# Patient Record
Sex: FEMALE | Race: WHITE | NOT HISPANIC OR LATINO
[De-identification: names, ages, dates, MRNs, and addresses within clinical notes are randomized per-mention and may not be internally consistent; named-entity substitution may affect disease eponyms.]

---

## 2017-05-08 ENCOUNTER — APPOINTMENT (OUTPATIENT)
Dept: ENDOCRINOLOGY | Facility: CLINIC | Age: 67
End: 2017-05-08

## 2017-06-16 ENCOUNTER — MEDICATION RENEWAL (OUTPATIENT)
Age: 67
End: 2017-06-16

## 2018-06-25 ENCOUNTER — NON-APPOINTMENT (OUTPATIENT)
Age: 68
End: 2018-06-25

## 2018-06-25 ENCOUNTER — APPOINTMENT (OUTPATIENT)
Dept: CARDIOLOGY | Facility: CLINIC | Age: 68
End: 2018-06-25
Payer: MEDICARE

## 2018-06-25 VITALS
SYSTOLIC BLOOD PRESSURE: 127 MMHG | RESPIRATION RATE: 12 BRPM | OXYGEN SATURATION: 97 % | HEART RATE: 82 BPM | DIASTOLIC BLOOD PRESSURE: 85 MMHG | HEIGHT: 63 IN | BODY MASS INDEX: 32.78 KG/M2 | WEIGHT: 185 LBS

## 2018-06-25 DIAGNOSIS — R42 DIZZINESS AND GIDDINESS: ICD-10-CM

## 2018-06-25 DIAGNOSIS — I73.9 PERIPHERAL VASCULAR DISEASE, UNSPECIFIED: ICD-10-CM

## 2018-06-25 PROCEDURE — 93306 TTE W/DOPPLER COMPLETE: CPT

## 2018-06-25 PROCEDURE — 99215 OFFICE O/P EST HI 40 MIN: CPT | Mod: 25

## 2019-01-04 ENCOUNTER — APPOINTMENT (OUTPATIENT)
Dept: ENDOCRINOLOGY | Facility: CLINIC | Age: 69
End: 2019-01-04
Payer: MEDICARE

## 2019-01-04 VITALS
BODY MASS INDEX: 33.59 KG/M2 | SYSTOLIC BLOOD PRESSURE: 120 MMHG | HEIGHT: 63 IN | DIASTOLIC BLOOD PRESSURE: 80 MMHG | OXYGEN SATURATION: 97 % | WEIGHT: 189.6 LBS | HEART RATE: 74 BPM

## 2019-01-04 DIAGNOSIS — E04.2 NONTOXIC MULTINODULAR GOITER: ICD-10-CM

## 2019-01-04 LAB
GLUCOSE BLDC GLUCOMTR-MCNC: 153
HBA1C MFR BLD HPLC: 6.3

## 2019-01-04 PROCEDURE — 99214 OFFICE O/P EST MOD 30 MIN: CPT | Mod: 25

## 2019-01-04 PROCEDURE — 83036 HEMOGLOBIN GLYCOSYLATED A1C: CPT | Mod: QW

## 2019-01-04 PROCEDURE — 82962 GLUCOSE BLOOD TEST: CPT

## 2019-01-04 NOTE — HISTORY OF PRESENT ILLNESS
[FreeTextEntry1] : 68 y.o. female with h/o Type 2 DM diet controlled and hypothyroidism and thyroid nodules here for follow up visit. Taking LT4 88 mcg daily since going to [facility] last year. C/o joint pains and back pain. Taking Vytorin 10/20 mg daily. No neck complaints. Never had repeat thyroid ultrasound. Last sonogram in October 2014 showed b/l nodules with largest right mid/lower pole measuring 2.2 cm. Had FNA of right nodule on 10/21/14 c/w nodular goiter with cystification.\par \par Regarding Type 2 DM,  watching diet but did eat bagel this morning.  Not monitoring FS at home. UTD with optho. No retinopathy. C/o dry eyes. No polyuria and no polydipsia.

## 2019-01-04 NOTE — REVIEW OF SYSTEMS
[Fatigue] : fatigue [Negative] : Gastrointestinal [Recent Weight Gain (___ Lbs)] : recent [unfilled] ~Ulb weight gain [Polyuria] : no polyuria [Joint Pain] : joint pain [Back Pain] : back pain [Pain/Numbness of Digits] : no pain/numbness of digits [Polydipsia] : no polydipsia [Swelling] : swelling

## 2019-01-04 NOTE — ASSESSMENT
[Carbohydrate Consistent Diet] : carbohydrate consistent diet [FreeTextEntry1] : 68 y.o. female with h/o Type 2 DM, hyperlipidemia, hypothyroidism and thyroid nodules.\par 1. Type 2 DM- Good control with Hba1c of 6.3%. Encouraged carbohydrate consistent diet and exercise. Will check CMP and urine microalb/cr ratio.\par 2. BP is at goal\par 3. Hyperlipidemia- Will check lipids and will continue Vytorin.\par 4. Hypothyroidism- Will check TFTs and adjust LT4 accordingly\par 5. Thyroid nodules- Will check thyroid ultrasound to evaluate nodules. If stable, will continue to monitor.\par \par Follow up in 4 months or when returns from [facility] [Importance of Diet and Exercise] : importance of diet and exercise to improve glycemic control, achieve weight loss and improve cardiovascular health

## 2019-01-04 NOTE — PHYSICAL EXAM
[Alert] : alert [Normal Sclera/Conjunctiva] : normal sclera/conjunctiva [EOMI] : extra ocular movement intact [No Accessory Muscle Use] : no accessory muscle use [Clear to Auscultation] : lungs were clear to auscultation bilaterally [Normal S1, S2] : normal S1 and S2 [Normal Bowel Sounds] : normal bowel sounds [Not Tender] : non-tender [Soft] : abdomen soft [Not Distended] : not distended [No Clubbing, Cyanosis] : no clubbing  or cyanosis of the fingernails [No Rash] : no rash [Right Foot Was Examined] : right foot ~C was examined [Left Foot Was Examined] : left foot ~C was examined [Swelling] : swollen [Normal] : normal [2+] : 2+ in the dorsalis pedis [Normal Reflexes] : deep tendon reflexes were 2+ and symmetric [Normal Affect] : the affect was normal [Normal Mood] : the mood was normal [de-identified] : right nodule [de-identified] : b/l edema

## 2019-01-04 NOTE — DATA REVIEWED
[FreeTextEntry1] : Labs\par 11/29/16\par glucose 91\par BUN/cr 12/0.68\par calcium 9.4\par LFTs normal\par chol 172 HDL 59 LDL 94 tri 95\par TSH 0.06 Free T4 0.9\par T3 137\par 25 vitamin D 26\par \par

## 2019-01-07 LAB
25(OH)D3 SERPL-MCNC: 28.8 NG/ML
ALBUMIN SERPL ELPH-MCNC: 4.3 G/DL
ALP BLD-CCNC: 90 U/L
ALT SERPL-CCNC: 24 U/L
ANION GAP SERPL CALC-SCNC: 10 MMOL/L
AST SERPL-CCNC: 22 U/L
BASOPHILS # BLD AUTO: 0.05 K/UL
BASOPHILS NFR BLD AUTO: 0.7 %
BILIRUB SERPL-MCNC: 0.7 MG/DL
BUN SERPL-MCNC: 10 MG/DL
CALCIUM SERPL-MCNC: 8.9 MG/DL
CHLORIDE SERPL-SCNC: 104 MMOL/L
CHOLEST SERPL-MCNC: 167 MG/DL
CHOLEST/HDLC SERPL: 3.1 RATIO
CO2 SERPL-SCNC: 26 MMOL/L
CREAT SERPL-MCNC: 0.66 MG/DL
CREAT SPEC-SCNC: 63 MG/DL
EOSINOPHIL # BLD AUTO: 0.14 K/UL
EOSINOPHIL NFR BLD AUTO: 1.9 %
GLUCOSE SERPL-MCNC: 98 MG/DL
HCT VFR BLD CALC: 40.7 %
HDLC SERPL-MCNC: 54 MG/DL
HGB BLD-MCNC: 12.8 G/DL
IMM GRANULOCYTES NFR BLD AUTO: 0.3 %
LDLC SERPL CALC-MCNC: 86 MG/DL
LYMPHOCYTES # BLD AUTO: 2.16 K/UL
LYMPHOCYTES NFR BLD AUTO: 28.9 %
MAN DIFF?: NORMAL
MCHC RBC-ENTMCNC: 28.4 PG
MCHC RBC-ENTMCNC: 31.4 GM/DL
MCV RBC AUTO: 90.4 FL
MICROALBUMIN 24H UR DL<=1MG/L-MCNC: <1.2 MG/DL
MICROALBUMIN/CREAT 24H UR-RTO: NORMAL
MONOCYTES # BLD AUTO: 0.66 K/UL
MONOCYTES NFR BLD AUTO: 8.8 %
NEUTROPHILS # BLD AUTO: 4.44 K/UL
NEUTROPHILS NFR BLD AUTO: 59.4 %
PLATELET # BLD AUTO: 319 K/UL
POTASSIUM SERPL-SCNC: 4.2 MMOL/L
PROT SERPL-MCNC: 7.4 G/DL
RBC # BLD: 4.5 M/UL
RBC # FLD: 13.9 %
SODIUM SERPL-SCNC: 140 MMOL/L
T4 FREE SERPL-MCNC: 1.6 NG/DL
TRIGL SERPL-MCNC: 137 MG/DL
TSH SERPL-ACNC: 0.07 UIU/ML
VIT B12 SERPL-MCNC: 1005 PG/ML
WBC # FLD AUTO: 7.47 K/UL

## 2019-01-07 RX ORDER — LEVOTHYROXINE SODIUM 0.09 MG/1
88 TABLET ORAL DAILY
Qty: 90 | Refills: 3 | Status: DISCONTINUED | COMMUNITY
Start: 2019-01-04 | End: 2019-01-07

## 2019-01-11 ENCOUNTER — NON-APPOINTMENT (OUTPATIENT)
Age: 69
End: 2019-01-11

## 2019-01-11 ENCOUNTER — APPOINTMENT (OUTPATIENT)
Dept: CARDIOLOGY | Facility: CLINIC | Age: 69
End: 2019-01-11
Payer: MEDICARE

## 2019-01-11 VITALS
TEMPERATURE: 97.4 F | HEIGHT: 63 IN | OXYGEN SATURATION: 94 % | HEART RATE: 78 BPM | BODY MASS INDEX: 33.31 KG/M2 | RESPIRATION RATE: 12 BRPM | DIASTOLIC BLOOD PRESSURE: 86 MMHG | SYSTOLIC BLOOD PRESSURE: 138 MMHG | WEIGHT: 188 LBS

## 2019-01-11 PROCEDURE — 99214 OFFICE O/P EST MOD 30 MIN: CPT | Mod: 25

## 2019-01-22 ENCOUNTER — NON-APPOINTMENT (OUTPATIENT)
Age: 69
End: 2019-01-22

## 2019-01-22 NOTE — HISTORY OF PRESENT ILLNESS
[FreeTextEntry1] : Patient is a 68-year-old woman with a history of hypothyroidism, hyperlipidemia, and family history of coronary artery disease who presents today for evaluation of hyperlipidemia. She mentions continuing to experience dyspnea with exertion. She otherwise denies any chest pain, dyspnea at rest, palpitations, dizziness, and headaches. She also indicates improvement in her lower extremity edema.

## 2019-01-22 NOTE — PHYSICAL EXAM
[General Appearance - Well Developed] : well developed [Normal Appearance] : normal appearance [Well Groomed] : well groomed [General Appearance - Well Nourished] : well nourished [No Deformities] : no deformities [General Appearance - In No Acute Distress] : no acute distress [Normal Conjunctiva] : the conjunctiva exhibited no abnormalities [FreeTextEntry1] : extraocular muscles intact. Anicteric sclerae. [Normal Oral Mucosa] : normal oral mucosa [No Oral Pallor] : no oral pallor [No Oral Cyanosis] : no oral cyanosis [Normal Jugular Venous A Waves Present] : normal jugular venous A waves present [Normal Jugular Venous V Waves Present] : normal jugular venous V waves present [No Jugular Venous Velasquez A Waves] : no jugular venous velasquez A waves [Respiration, Rhythm And Depth] : normal respiratory rhythm and effort [Exaggerated Use Of Accessory Muscles For Inspiration] : no accessory muscle use [Auscultation Breath Sounds / Voice Sounds] : lungs were clear to auscultation bilaterally [Bowel Sounds] : normal bowel sounds [Abdomen Soft] : soft [Abdomen Tenderness] : non-tender [Abnormal Walk] : normal gait [Gait - Sufficient For Exercise Testing] : the gait was sufficient for exercise testing [Nail Clubbing] : no clubbing of the fingernails [Cyanosis, Localized] : no localized cyanosis [Petechial Hemorrhages (___cm)] : no petechial hemorrhages [Skin Color & Pigmentation] : normal skin color and pigmentation [] : no rash [Skin Lesions] : no skin lesions [No Skin Ulcers] : no skin ulcer [Oriented To Time, Place, And Person] : oriented to person, place, and time [Affect] : the affect was normal [Mood] : the mood was normal [No Anxiety] : not feeling anxious [Normal Rate] : normal [Rhythm Regular] : regular [Normal S1] : normal S1 [Normal S2] : normal S2 [S3] : no S3 [No Murmur] : no murmurs heard [Right Carotid Bruit] : no bruit heard over the right carotid [Left Carotid Bruit] : no bruit heard over the left carotid [1+] : left 1+ [Bruit] : no bruit heard [Nonpitting Edema] : nonpitting edema present

## 2019-01-22 NOTE — DISCUSSION/SUMMARY
[FreeTextEntry1] : IMPRESSION: Mrs. Lieberman is a 68-year-old woman with a history of hypothyroidism, hyperlipidemia, and family history of coronary artery disease presents today for follow up of hyperlipidemia and evaluation of dyspnea with exertion.\par \par PLAN:\par 1. Her most recent lipid profile was good, thus she will continue on Vytorin 10-20mg daily.\par 2. I have asked her to schedule an exercise stress test given her dyspnea with exertion.\par 3. She will followup with me after her stress test is performed should there be any abnormalities.\par

## 2019-01-23 ENCOUNTER — APPOINTMENT (OUTPATIENT)
Dept: CARDIOLOGY | Facility: CLINIC | Age: 69
End: 2019-01-23
Payer: MEDICARE

## 2019-01-23 PROCEDURE — 93015 CV STRESS TEST SUPVJ I&R: CPT

## 2019-02-06 ENCOUNTER — APPOINTMENT (OUTPATIENT)
Dept: CARDIOLOGY | Facility: CLINIC | Age: 69
End: 2019-02-06
Payer: MEDICARE

## 2019-02-06 PROCEDURE — 78452 HT MUSCLE IMAGE SPECT MULT: CPT

## 2019-02-06 PROCEDURE — A9500: CPT

## 2019-02-06 PROCEDURE — 93015 CV STRESS TEST SUPVJ I&R: CPT

## 2019-02-15 ENCOUNTER — OUTPATIENT (OUTPATIENT)
Dept: OUTPATIENT SERVICES | Facility: HOSPITAL | Age: 69
LOS: 1 days | End: 2019-02-15
Payer: MEDICARE

## 2019-02-15 VITALS
OXYGEN SATURATION: 97 % | TEMPERATURE: 98 F | DIASTOLIC BLOOD PRESSURE: 81 MMHG | RESPIRATION RATE: 18 BRPM | HEIGHT: 63 IN | HEART RATE: 86 BPM | SYSTOLIC BLOOD PRESSURE: 157 MMHG | WEIGHT: 182.1 LBS

## 2019-02-15 DIAGNOSIS — Z90.89 ACQUIRED ABSENCE OF OTHER ORGANS: Chronic | ICD-10-CM

## 2019-02-15 DIAGNOSIS — Z98.1 ARTHRODESIS STATUS: Chronic | ICD-10-CM

## 2019-02-15 DIAGNOSIS — I25.10 ATHEROSCLEROTIC HEART DISEASE OF NATIVE CORONARY ARTERY WITHOUT ANGINA PECTORIS: ICD-10-CM

## 2019-02-15 DIAGNOSIS — Z01.818 ENCOUNTER FOR OTHER PREPROCEDURAL EXAMINATION: ICD-10-CM

## 2019-02-15 DIAGNOSIS — Z98.890 OTHER SPECIFIED POSTPROCEDURAL STATES: Chronic | ICD-10-CM

## 2019-02-15 DIAGNOSIS — Z90.721 ACQUIRED ABSENCE OF OVARIES, UNILATERAL: Chronic | ICD-10-CM

## 2019-02-15 LAB
ALBUMIN SERPL ELPH-MCNC: 4.3 G/DL — SIGNIFICANT CHANGE UP (ref 3.3–5)
ALP SERPL-CCNC: 90 U/L — SIGNIFICANT CHANGE UP (ref 40–120)
ALT FLD-CCNC: 20 U/L — SIGNIFICANT CHANGE UP (ref 10–45)
ANION GAP SERPL CALC-SCNC: 12 MMOL/L — SIGNIFICANT CHANGE UP (ref 5–17)
AST SERPL-CCNC: 17 U/L — SIGNIFICANT CHANGE UP (ref 10–40)
BILIRUB SERPL-MCNC: 1.2 MG/DL — SIGNIFICANT CHANGE UP (ref 0.2–1.2)
BUN SERPL-MCNC: 14 MG/DL — SIGNIFICANT CHANGE UP (ref 7–23)
CALCIUM SERPL-MCNC: 9.4 MG/DL — SIGNIFICANT CHANGE UP (ref 8.4–10.5)
CHLORIDE SERPL-SCNC: 101 MMOL/L — SIGNIFICANT CHANGE UP (ref 96–108)
CO2 SERPL-SCNC: 25 MMOL/L — SIGNIFICANT CHANGE UP (ref 22–31)
CREAT SERPL-MCNC: 0.57 MG/DL — SIGNIFICANT CHANGE UP (ref 0.5–1.3)
GLUCOSE SERPL-MCNC: 115 MG/DL — HIGH (ref 70–99)
HCT VFR BLD CALC: 39 % — SIGNIFICANT CHANGE UP (ref 34.5–45)
HGB BLD-MCNC: 13.3 G/DL — SIGNIFICANT CHANGE UP (ref 11.5–15.5)
MCHC RBC-ENTMCNC: 29.5 PG — SIGNIFICANT CHANGE UP (ref 27–34)
MCHC RBC-ENTMCNC: 34.1 GM/DL — SIGNIFICANT CHANGE UP (ref 32–36)
MCV RBC AUTO: 86.5 FL — SIGNIFICANT CHANGE UP (ref 80–100)
PLATELET # BLD AUTO: 248 K/UL — SIGNIFICANT CHANGE UP (ref 150–400)
POTASSIUM SERPL-MCNC: 3.7 MMOL/L — SIGNIFICANT CHANGE UP (ref 3.5–5.3)
POTASSIUM SERPL-SCNC: 3.7 MMOL/L — SIGNIFICANT CHANGE UP (ref 3.5–5.3)
PROT SERPL-MCNC: 7.8 G/DL — SIGNIFICANT CHANGE UP (ref 6–8.3)
RBC # BLD: 4.51 M/UL — SIGNIFICANT CHANGE UP (ref 3.8–5.2)
RBC # FLD: 12.2 % — SIGNIFICANT CHANGE UP (ref 10.3–14.5)
SODIUM SERPL-SCNC: 138 MMOL/L — SIGNIFICANT CHANGE UP (ref 135–145)
WBC # BLD: 6.7 K/UL — SIGNIFICANT CHANGE UP (ref 3.8–10.5)
WBC # FLD AUTO: 6.7 K/UL — SIGNIFICANT CHANGE UP (ref 3.8–10.5)

## 2019-02-15 PROCEDURE — 93005 ELECTROCARDIOGRAM TRACING: CPT

## 2019-02-15 PROCEDURE — 99204 OFFICE O/P NEW MOD 45 MIN: CPT

## 2019-02-15 PROCEDURE — C1769: CPT

## 2019-02-15 PROCEDURE — C1894: CPT

## 2019-02-15 PROCEDURE — 93458 L HRT ARTERY/VENTRICLE ANGIO: CPT | Mod: 26,GC

## 2019-02-15 PROCEDURE — 99152 MOD SED SAME PHYS/QHP 5/>YRS: CPT | Mod: GC

## 2019-02-15 PROCEDURE — 99152 MOD SED SAME PHYS/QHP 5/>YRS: CPT

## 2019-02-15 PROCEDURE — 85027 COMPLETE CBC AUTOMATED: CPT

## 2019-02-15 PROCEDURE — 93010 ELECTROCARDIOGRAM REPORT: CPT

## 2019-02-15 PROCEDURE — 80053 COMPREHEN METABOLIC PANEL: CPT

## 2019-02-15 PROCEDURE — C1887: CPT

## 2019-02-15 PROCEDURE — 93458 L HRT ARTERY/VENTRICLE ANGIO: CPT

## 2019-02-15 RX ORDER — SODIUM CHLORIDE 9 MG/ML
3 INJECTION INTRAMUSCULAR; INTRAVENOUS; SUBCUTANEOUS EVERY 8 HOURS
Qty: 0 | Refills: 0 | Status: DISCONTINUED | OUTPATIENT
Start: 2019-02-15 | End: 2019-03-02

## 2019-02-15 NOTE — H&P CARDIOLOGY - FAMILY HISTORY
Father  Still living? No  Family history of lung cancer, Age at diagnosis: Age Unknown     Sibling  Still living? Yes, Estimated age: Age Unknown  Family history of heart disease, Age at diagnosis: Age Unknown     Mother  Still living? No  Family history of rheumatic heart disease, Age at diagnosis: Age Unknown

## 2019-02-15 NOTE — H&P CARDIOLOGY - PSH
H/O spinal fusion    H/O unilateral oophorectomy    History of tonsillectomy    S/P bilateral breast reduction

## 2019-02-15 NOTE — H&P CARDIOLOGY - HISTORY OF PRESENT ILLNESS
68 yo French female pt with PMHx of hypothyroidism, HLD, arthritis and family Hx of CAD presents for cardiac cath. pt reports she feels PALMA, evaluated by Dr. Moreland and had abnormal stress test. Pt denies chest pain, dizziness or palpitation.   Stress test: small mild defect of the basal to mid anterior walls that are fixed with normal wall motion and correct with prone imaging, suggestive breast attenuation artifact. ST depression 2mm horizontal in V3-6 EF 68%

## 2019-03-01 PROBLEM — R06.09 OTHER FORMS OF DYSPNEA: Chronic | Status: ACTIVE | Noted: 2019-02-15

## 2019-03-01 PROBLEM — E78.5 HYPERLIPIDEMIA, UNSPECIFIED: Chronic | Status: ACTIVE | Noted: 2019-02-15

## 2019-03-01 PROBLEM — E03.9 HYPOTHYROIDISM, UNSPECIFIED: Chronic | Status: ACTIVE | Noted: 2019-02-15

## 2019-03-01 PROBLEM — M19.90 UNSPECIFIED OSTEOARTHRITIS, UNSPECIFIED SITE: Chronic | Status: ACTIVE | Noted: 2019-02-15

## 2019-03-04 ENCOUNTER — APPOINTMENT (OUTPATIENT)
Dept: PULMONOLOGY | Facility: CLINIC | Age: 69
End: 2019-03-04
Payer: MEDICARE

## 2019-03-04 VITALS
BODY MASS INDEX: 34.41 KG/M2 | OXYGEN SATURATION: 95 % | RESPIRATION RATE: 16 BRPM | WEIGHT: 187 LBS | SYSTOLIC BLOOD PRESSURE: 125 MMHG | DIASTOLIC BLOOD PRESSURE: 86 MMHG | HEART RATE: 78 BPM | HEIGHT: 62 IN

## 2019-03-04 DIAGNOSIS — Z86.39 PERSONAL HISTORY OF OTHER ENDOCRINE, NUTRITIONAL AND METABOLIC DISEASE: ICD-10-CM

## 2019-03-04 DIAGNOSIS — R05 COUGH: ICD-10-CM

## 2019-03-04 PROCEDURE — 99203 OFFICE O/P NEW LOW 30 MIN: CPT | Mod: 25

## 2019-03-04 PROCEDURE — 94729 DIFFUSING CAPACITY: CPT

## 2019-03-04 PROCEDURE — 94060 EVALUATION OF WHEEZING: CPT

## 2019-03-04 PROCEDURE — 94727 GAS DIL/WSHOT DETER LNG VOL: CPT

## 2019-03-04 RX ORDER — AMOXICILLIN AND CLAVULANATE POTASSIUM 875; 125 MG/1; MG/1
875-125 TABLET, COATED ORAL
Qty: 10 | Refills: 0 | Status: COMPLETED | COMMUNITY
Start: 2019-02-10

## 2019-03-04 RX ORDER — FUROSEMIDE 20 MG/1
20 TABLET ORAL
Refills: 0 | Status: COMPLETED | COMMUNITY
End: 2019-03-04

## 2019-03-04 RX ORDER — FLUOCINONIDE 0.5 MG/G
0.05 CREAM TOPICAL
Qty: 60 | Refills: 0 | Status: COMPLETED | COMMUNITY
Start: 2019-01-31

## 2019-03-04 RX ORDER — CELECOXIB 200 MG/1
200 CAPSULE ORAL
Qty: 60 | Refills: 0 | Status: COMPLETED | COMMUNITY
Start: 2019-01-31

## 2019-03-05 PROBLEM — R05 CHRONIC COUGHING: Status: ACTIVE | Noted: 2019-03-05

## 2019-03-05 NOTE — DISCUSSION/SUMMARY
[FreeTextEntry1] : 68 yo female with complaints consistent with sleep apnea. PSG will be performed. She is to avoid sedative/hypnotic meds and excessive alcohol use. Diet and weight loss also discussed. She was prescribed albuterol MDI for PRN use given her cough, despite normal PFT findings today.

## 2019-03-05 NOTE — HISTORY OF PRESENT ILLNESS
[FreeTextEntry1] : 68 yo female presents for evaluation of possible sleep apnea. The patient snores and has witnessed apneic episodes. She complains of daytime somnolence and fatigue. The patient also complains of PALMA without cough, chest pain or hemoptysis. She had a recent cardiac catheterization at [facility] which was "normal".

## 2019-03-05 NOTE — REVIEW OF SYSTEMS
[Fatigue] : fatigue [As Noted in HPI] : as noted in HPI [Cough] : cough [Sputum] : not coughing up ~M sputum [Dyspnea] : dyspnea [Thyroid Problem] : thyroid problem [Snoring] : snoring [Witnessed Apneas] : demonstrated ~M apnea [Hypersomnolence] : sleeping much more than usual [Negative] : Pulmonary Hypertension

## 2019-03-08 ENCOUNTER — NON-APPOINTMENT (OUTPATIENT)
Age: 69
End: 2019-03-08

## 2019-03-08 ENCOUNTER — APPOINTMENT (OUTPATIENT)
Dept: CARDIOLOGY | Facility: CLINIC | Age: 69
End: 2019-03-08
Payer: MEDICARE

## 2019-03-08 VITALS
RESPIRATION RATE: 14 BRPM | HEART RATE: 73 BPM | OXYGEN SATURATION: 98 % | HEIGHT: 62 IN | DIASTOLIC BLOOD PRESSURE: 80 MMHG | TEMPERATURE: 97.4 F | SYSTOLIC BLOOD PRESSURE: 137 MMHG | WEIGHT: 188 LBS | BODY MASS INDEX: 34.6 KG/M2

## 2019-03-08 PROCEDURE — 99213 OFFICE O/P EST LOW 20 MIN: CPT | Mod: 25

## 2019-03-12 ENCOUNTER — NON-APPOINTMENT (OUTPATIENT)
Age: 69
End: 2019-03-12

## 2019-03-12 NOTE — HISTORY OF PRESENT ILLNESS
[FreeTextEntry1] : Patient is a 69-year-old woman with a history of hypothyroidism, hyperlipidemia, and family history of coronary artery disease who presents today for follow up of dyspnea following cardiac catheterization. She states that her dyspnea is likely weight related. She also states that she has been suffering from post-nasal drip. She otherwise denies any chest pain, dyspnea at rest, palpitations, dizziness, and headaches.

## 2019-03-12 NOTE — PHYSICAL EXAM
[General Appearance - Well Developed] : well developed [Normal Appearance] : normal appearance [Well Groomed] : well groomed [General Appearance - Well Nourished] : well nourished [No Deformities] : no deformities [General Appearance - In No Acute Distress] : no acute distress [Normal Conjunctiva] : the conjunctiva exhibited no abnormalities [Normal Oral Mucosa] : normal oral mucosa [No Oral Pallor] : no oral pallor [No Oral Cyanosis] : no oral cyanosis [Normal Jugular Venous A Waves Present] : normal jugular venous A waves present [Normal Jugular Venous V Waves Present] : normal jugular venous V waves present [No Jugular Venous Velasquez A Waves] : no jugular venous velasquez A waves [Respiration, Rhythm And Depth] : normal respiratory rhythm and effort [Exaggerated Use Of Accessory Muscles For Inspiration] : no accessory muscle use [Auscultation Breath Sounds / Voice Sounds] : lungs were clear to auscultation bilaterally [Bowel Sounds] : normal bowel sounds [Abdomen Soft] : soft [Abdomen Tenderness] : non-tender [Abnormal Walk] : normal gait [Gait - Sufficient For Exercise Testing] : the gait was sufficient for exercise testing [Nail Clubbing] : no clubbing of the fingernails [Cyanosis, Localized] : no localized cyanosis [Petechial Hemorrhages (___cm)] : no petechial hemorrhages [Skin Color & Pigmentation] : normal skin color and pigmentation [] : no rash [Skin Lesions] : no skin lesions [No Skin Ulcers] : no skin ulcer [Oriented To Time, Place, And Person] : oriented to person, place, and time [Affect] : the affect was normal [Mood] : the mood was normal [No Anxiety] : not feeling anxious [Normal Rate] : normal [Rhythm Regular] : regular [Normal S1] : normal S1 [Normal S2] : normal S2 [No Murmur] : no murmurs heard [2+] : right 2+ [Nonpitting Edema] : nonpitting edema present [FreeTextEntry1] : extraocular muscles intact. Anicteric sclerae. [S3] : no S3 [Right Carotid Bruit] : no bruit heard over the right carotid [Left Carotid Bruit] : no bruit heard over the left carotid [Bruit] : no bruit heard

## 2019-03-12 NOTE — DISCUSSION/SUMMARY
[FreeTextEntry1] : IMPRESSION: Mrs. Lieberman is a 69-year-old woman with a history of hypothyroidism, hyperlipidemia, and family history of coronary artery disease presents today for follow up of dyspnea with exertion following left heart cath.\par \par PLAN:\par 1. Her most recent lipid profile was good, thus she will continue on Vytorin 10-20mg daily.\par 2. Her cardiac catheterization revealed minimal atherosclerosis, thus no further cardiac workup is necessary at this time. \par 3. She will continue to follow up with Pulmonary given her dyspnea with exertion.

## 2020-01-02 ENCOUNTER — APPOINTMENT (OUTPATIENT)
Dept: CARDIOLOGY | Facility: CLINIC | Age: 70
End: 2020-01-02
Payer: MEDICARE

## 2020-01-02 ENCOUNTER — NON-APPOINTMENT (OUTPATIENT)
Age: 70
End: 2020-01-02

## 2020-01-02 VITALS
RESPIRATION RATE: 12 BRPM | SYSTOLIC BLOOD PRESSURE: 126 MMHG | WEIGHT: 190 LBS | DIASTOLIC BLOOD PRESSURE: 76 MMHG | HEART RATE: 85 BPM | OXYGEN SATURATION: 98 % | TEMPERATURE: 98.6 F | BODY MASS INDEX: 34.96 KG/M2 | HEIGHT: 62 IN

## 2020-01-02 DIAGNOSIS — M25.561 PAIN IN RIGHT KNEE: ICD-10-CM

## 2020-01-02 DIAGNOSIS — R94.31 ABNORMAL ELECTROCARDIOGRAM [ECG] [EKG]: ICD-10-CM

## 2020-01-02 PROCEDURE — 99214 OFFICE O/P EST MOD 30 MIN: CPT | Mod: 25

## 2020-01-03 ENCOUNTER — APPOINTMENT (OUTPATIENT)
Dept: ENDOCRINOLOGY | Facility: CLINIC | Age: 70
End: 2020-01-03

## 2020-01-03 NOTE — DISCUSSION/SUMMARY
[FreeTextEntry1] : IMPRESSION: Mrs. Lieberman is a 69-year-old woman with a history of hypothyroidism, hyperlipidemia, and family history of coronary artery disease presents today for follow up of hyperlipidemia.\par \par PLAN:\par 1. She will continue on Vytorin 10-20mg daily and she will get me a copy of the blood work that she had yesterday. Her cardiac catheterization that was performed about 11 months ago revealed minimal atherosclerosis. I have asked her to schedule an echocardiogram given her abnormal ECG. \par 2. I have suggested possibly seeing Ortho given her knee pain. I have prescribed Meloxicam 7.5 mg 1-2 tablets daily as she states that this has helped her knee pain in the past.\par 3. She will follow up with me in a year or sooner should there be any abnormalities on her echocardiogram.

## 2020-01-03 NOTE — HISTORY OF PRESENT ILLNESS
[FreeTextEntry1] : Patient is a 69-year-old woman with a history of hypothyroidism, hyperlipidemia, and family history of coronary artery disease who presents today for follow up of hyperlipidemia. She states that her major issue at this time is her joint pain. She states that her right knee has been giving her more trouble recently. She states that her dyspnea with exertion appears to occur when her joints are bothering her. She otherwise denies any chest pain, dyspnea at rest, palpitations, dizziness, and headaches.

## 2020-01-03 NOTE — PHYSICAL EXAM
[Normal Appearance] : normal appearance [General Appearance - Well Developed] : well developed [Well Groomed] : well groomed [No Deformities] : no deformities [General Appearance - Well Nourished] : well nourished [Normal Conjunctiva] : the conjunctiva exhibited no abnormalities [General Appearance - In No Acute Distress] : no acute distress [Normal Oral Mucosa] : normal oral mucosa [No Oral Cyanosis] : no oral cyanosis [No Oral Pallor] : no oral pallor [Normal Jugular Venous A Waves Present] : normal jugular venous A waves present [Normal Jugular Venous V Waves Present] : normal jugular venous V waves present [No Jugular Venous Velasquez A Waves] : no jugular venous velasquez A waves [Respiration, Rhythm And Depth] : normal respiratory rhythm and effort [Exaggerated Use Of Accessory Muscles For Inspiration] : no accessory muscle use [Auscultation Breath Sounds / Voice Sounds] : lungs were clear to auscultation bilaterally [Bowel Sounds] : normal bowel sounds [Abdomen Soft] : soft [Abdomen Tenderness] : non-tender [Abnormal Walk] : normal gait [Gait - Sufficient For Exercise Testing] : the gait was sufficient for exercise testing [Nail Clubbing] : no clubbing of the fingernails [Cyanosis, Localized] : no localized cyanosis [Petechial Hemorrhages (___cm)] : no petechial hemorrhages [Skin Color & Pigmentation] : normal skin color and pigmentation [] : no rash [No Skin Ulcers] : no skin ulcer [Oriented To Time, Place, And Person] : oriented to person, place, and time [Mood] : the mood was normal [No Anxiety] : not feeling anxious [Affect] : the affect was normal [Normal Rate] : normal [Rhythm Regular] : regular [Normal S1] : normal S1 [No Murmur] : no murmurs heard [Normal S2] : normal S2 [FreeTextEntry1] : She has swelling of her right knee, possibly with a joint effusion. No effusion was appreciated of the left knee. [S3] : no S3 [Right Carotid Bruit] : no bruit heard over the right carotid [Left Carotid Bruit] : no bruit heard over the left carotid [Bruit] : no bruit heard

## 2020-01-22 ENCOUNTER — APPOINTMENT (OUTPATIENT)
Dept: CARDIOLOGY | Facility: CLINIC | Age: 70
End: 2020-01-22
Payer: MEDICARE

## 2020-01-22 PROCEDURE — 93306 TTE W/DOPPLER COMPLETE: CPT

## 2020-02-27 ENCOUNTER — RESULT REVIEW (OUTPATIENT)
Age: 70
End: 2020-02-27

## 2020-03-03 ENCOUNTER — APPOINTMENT (OUTPATIENT)
Dept: NEUROSURGERY | Facility: CLINIC | Age: 70
End: 2020-03-03
Payer: MEDICARE

## 2020-03-03 DIAGNOSIS — M48.061 SPINAL STENOSIS, LUMBAR REGION WITHOUT NEUROGENIC CLAUDICATION: ICD-10-CM

## 2020-03-03 PROCEDURE — 99205 OFFICE O/P NEW HI 60 MIN: CPT

## 2020-03-03 NOTE — ASSESSMENT
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  History of anterior cervical discectomy and fusion due to myelopathy back in 2003.  I personally reviewed the MRI of the cervical and lumbar spine the patient brought in a CD today in the clinic.  There are indications of anterior cervical discectomy and fusion at the level of C4-C5 with some adjacent level disease with central canal stenosis above the previous fusion and mild compression of the spinal cord.  There is mildly increased signal inside the spinal cord at the adjacent level and also at the level of the fusion which probably represent residual signal from the previous myelopathy.  Also there is significant degenerative disease in the lumbar spine with lumbar scoliosis and foraminal stenosis and central canal stenosis at the level of L2-L3 and L3-L4.  The patient examination revealed mild Wilmer on the left side mostly in the left on the right side without significant clonus and negative Babinski side.  There is also mild weakness on the left arm.  The patient has never tried physical therapy and spinal injections for her lumbar spine problem.  At the present time I would recommend a course of physical therapy and possible spinal injection for her lumbar spine.  In parallel I would recommend CT of the cervical spine and lumbar spine along with flexion extension x-rays.  I offered the patient surgical procedure with a cervical laminectomy and possible fusion of the cervical spine and the level of the central canal stenosis at stated shown to the previous fusion.  I would like to see the cervical CT in the flexion extension x-rays in order to determine exactly the procedure I will offer.  The patient is very happy with this plan and she is going to get cervical CT scan, lumbar CT scan and flexion extension x-rays followed by a visit in my clinic.  In the meantime she is happy to start having physical therapy for her lumbar spine.

## 2020-03-03 NOTE — HISTORY OF PRESENT ILLNESS
[de-identified] : Ms. Lieberman was seen today my clinic and accompanied by her  and her daughter.  This  70 years old lady with history of anterior cervical discectomy and fusion which was done back in 2003 presumably due to cervical myelopathy.  This patient has been complained for some neck pain and shoulder pain for the last year which is gradually getting worse.  She also has been complained for low back pain and left leg pain in the distribution of L2 and L3 nerve root.She also complains for some neck pain mostly during the night.She denies any significant weakness of the upper extremities.  She is not able to elevate her arms above the level of the shoulders due to pain as the patient states.  She denies any significant issues with fine movements of the fingers.  Her myelopathic symptoms improve after the first operation.She also has been having some balance issues with gradual deterioration in the last years.  She denies any pain on the right leg except from some localized pain around the knee where is getting treatment with injections from orthopedics surgeon.She also denies any issues with bladder and bowel control.  She denies any numbness and tingling sensation around the genitalia area.  The patient recently had an MRI of the cervical spine and MRI of the lumbar spine which were brought to the clinic today on a CD.  I personally reviewed the MRI the patient brought.She has never had any physical therapy or epidural injections in her lower back.

## 2020-03-03 NOTE — REVIEW OF SYSTEMS
[Eyesight Problems] : eyesight problems [Joint Swelling] : joint swelling [Joint Pain] : joint pain [Negative] : Heme/Lymph [de-identified] : LLE pain upon ambulation [FreeTextEntry9] : bilateral frozen shoulders, left groin pain when ambulates

## 2020-03-03 NOTE — PHYSICAL EXAM
[Oriented To Time, Place, And Person] : oriented to person, place, and time [General Appearance - Alert] : alert [General Appearance - In No Acute Distress] : in no acute distress [Affect] : the affect was normal [Impaired Insight] : insight and judgment were intact [Person] : oriented to person [Place] : oriented to place [Time] : oriented to time [Remote Intact] : remote memory intact [Short Term Intact] : short term memory intact [Span Intact] : the attention span was normal [Fluency] : fluency intact [Concentration Intact] : normal concentrating ability [Current Events] : adequate knowledge of current events [Comprehension] : comprehension intact [Past History] : adequate knowledge of personal past history [Vocabulary] : adequate range of vocabulary [Cranial Nerves Oculomotor (III)] : extraocular motion intact [Cranial Nerves Trigeminal (V)] : facial sensation intact symmetrically [Cranial Nerves Vestibulocochlear (VIII)] : hearing was intact bilaterally [Cranial Nerves Glossopharyngeal (IX)] : tongue and palate midline [Cranial Nerves Facial (VII)] : face symmetrical [Cranial Nerves Hypoglossal (XII)] : there was no tongue deviation with protrusion [Cranial Nerves Accessory (XI - Cranial And Spinal)] : head turning and shoulder shrug symmetric [Motor Tone] : muscle tone was normal in all four extremities [No Muscle Atrophy] : normal bulk in all four extremities [Motor Strength] : muscle strength was normal in all four extremities [Sensation Tactile Decrease] : light touch was intact [Abnormal Walk] : normal gait [Balance] : balance was intact [Extraocular Movements] : extraocular movements were intact [] : no respiratory distress [Neck Appearance] : the appearance of the neck was normal [Outer Ear] : the ears and nose were normal in appearance [Exaggerated Use Of Accessory Muscles For Inspiration] : no accessory muscle use [Respiration, Rhythm And Depth] : normal respiratory rhythm and effort [Heart Rate And Rhythm] : heart rate was normal and rhythm regular [Skin Color & Pigmentation] : normal skin color and pigmentation [Involuntary Movements] : no involuntary movements were seen [Skin Turgor] : normal skin turgor [5] : S1 ankle flexors 5/5 [1+] : Triceps right 1+ [No Visual Abnormalities] : no visible abnormalities [Past-pointing] : there was no past-pointing [Tremor] : no tremor present [Plantar Reflex Right Only] : normal on the right [Plantar Reflex Left Only] : normal on the left [___] : absent on the right [___] : absent on the left [de-identified] : positive Left Nevarez sign\par Slightly positive Nevarez on right

## 2020-07-24 ENCOUNTER — INPATIENT (INPATIENT)
Facility: HOSPITAL | Age: 70
LOS: 2 days | Discharge: HOME | DRG: 177 | End: 2020-07-27
Attending: INTERNAL MEDICINE | Admitting: INTERNAL MEDICINE
Payer: MEDICARE

## 2020-07-24 VITALS
DIASTOLIC BLOOD PRESSURE: 85 MMHG | OXYGEN SATURATION: 96 % | HEIGHT: 64 IN | RESPIRATION RATE: 20 BRPM | TEMPERATURE: 100 F | WEIGHT: 179.9 LBS | SYSTOLIC BLOOD PRESSURE: 127 MMHG | HEART RATE: 73 BPM

## 2020-07-24 DIAGNOSIS — U07.1 COVID-19: ICD-10-CM

## 2020-07-24 DIAGNOSIS — Z90.721 ACQUIRED ABSENCE OF OVARIES, UNILATERAL: Chronic | ICD-10-CM

## 2020-07-24 DIAGNOSIS — Z90.89 ACQUIRED ABSENCE OF OTHER ORGANS: Chronic | ICD-10-CM

## 2020-07-24 DIAGNOSIS — Z98.890 OTHER SPECIFIED POSTPROCEDURAL STATES: Chronic | ICD-10-CM

## 2020-07-24 DIAGNOSIS — Z98.1 ARTHRODESIS STATUS: Chronic | ICD-10-CM

## 2020-07-24 LAB
ALBUMIN SERPL ELPH-MCNC: 4.6 G/DL — SIGNIFICANT CHANGE UP (ref 3.3–5)
ALP SERPL-CCNC: 74 U/L — SIGNIFICANT CHANGE UP (ref 40–120)
ALT FLD-CCNC: 23 U/L — SIGNIFICANT CHANGE UP (ref 10–45)
ANION GAP SERPL CALC-SCNC: 11 MMOL/L — SIGNIFICANT CHANGE UP (ref 5–17)
AST SERPL-CCNC: 25 U/L — SIGNIFICANT CHANGE UP (ref 10–40)
BASOPHILS # BLD AUTO: 0.03 K/UL — SIGNIFICANT CHANGE UP (ref 0–0.2)
BASOPHILS NFR BLD AUTO: 0.7 % — SIGNIFICANT CHANGE UP (ref 0–2)
BILIRUB SERPL-MCNC: 0.6 MG/DL — SIGNIFICANT CHANGE UP (ref 0.2–1.2)
BUN SERPL-MCNC: 11 MG/DL — SIGNIFICANT CHANGE UP (ref 7–23)
CALCIUM SERPL-MCNC: 8.8 MG/DL — SIGNIFICANT CHANGE UP (ref 8.4–10.5)
CHLORIDE SERPL-SCNC: 102 MMOL/L — SIGNIFICANT CHANGE UP (ref 96–108)
CO2 SERPL-SCNC: 25 MMOL/L — SIGNIFICANT CHANGE UP (ref 22–31)
CREAT SERPL-MCNC: 0.63 MG/DL — SIGNIFICANT CHANGE UP (ref 0.5–1.3)
CRP SERPL-MCNC: 0.65 MG/DL — HIGH (ref 0–0.4)
D DIMER BLD IA.RAPID-MCNC: 195 NG/ML DDU — SIGNIFICANT CHANGE UP
EOSINOPHIL # BLD AUTO: 0.02 K/UL — SIGNIFICANT CHANGE UP (ref 0–0.5)
EOSINOPHIL NFR BLD AUTO: 0.4 % — SIGNIFICANT CHANGE UP (ref 0–6)
GLUCOSE SERPL-MCNC: 102 MG/DL — HIGH (ref 70–99)
HCT VFR BLD CALC: 39.7 % — SIGNIFICANT CHANGE UP (ref 34.5–45)
HGB BLD-MCNC: 12.7 G/DL — SIGNIFICANT CHANGE UP (ref 11.5–15.5)
IMM GRANULOCYTES NFR BLD AUTO: 0.2 % — SIGNIFICANT CHANGE UP (ref 0–1.5)
LYMPHOCYTES # BLD AUTO: 1.17 K/UL — SIGNIFICANT CHANGE UP (ref 1–3.3)
LYMPHOCYTES # BLD AUTO: 25.6 % — SIGNIFICANT CHANGE UP (ref 13–44)
MCHC RBC-ENTMCNC: 28.7 PG — SIGNIFICANT CHANGE UP (ref 27–34)
MCHC RBC-ENTMCNC: 32 GM/DL — SIGNIFICANT CHANGE UP (ref 32–36)
MCV RBC AUTO: 89.8 FL — SIGNIFICANT CHANGE UP (ref 80–100)
MONOCYTES # BLD AUTO: 0.34 K/UL — SIGNIFICANT CHANGE UP (ref 0–0.9)
MONOCYTES NFR BLD AUTO: 7.4 % — SIGNIFICANT CHANGE UP (ref 2–14)
NEUTROPHILS # BLD AUTO: 3 K/UL — SIGNIFICANT CHANGE UP (ref 1.8–7.4)
NEUTROPHILS NFR BLD AUTO: 65.7 % — SIGNIFICANT CHANGE UP (ref 43–77)
NRBC # BLD: 0 /100 WBCS — SIGNIFICANT CHANGE UP (ref 0–0)
PLATELET # BLD AUTO: 247 K/UL — SIGNIFICANT CHANGE UP (ref 150–400)
POTASSIUM SERPL-MCNC: 3.4 MMOL/L — LOW (ref 3.5–5.3)
POTASSIUM SERPL-SCNC: 3.4 MMOL/L — LOW (ref 3.5–5.3)
PROCALCITONIN SERPL-MCNC: 0.04 NG/ML — SIGNIFICANT CHANGE UP (ref 0.02–0.1)
PROT SERPL-MCNC: 7.8 G/DL — SIGNIFICANT CHANGE UP (ref 6–8.3)
RBC # BLD: 4.42 M/UL — SIGNIFICANT CHANGE UP (ref 3.8–5.2)
RBC # FLD: 12.9 % — SIGNIFICANT CHANGE UP (ref 10.3–14.5)
SARS-COV-2 RNA SPEC QL NAA+PROBE: DETECTED
SODIUM SERPL-SCNC: 138 MMOL/L — SIGNIFICANT CHANGE UP (ref 135–145)
WBC # BLD: 4.57 K/UL — SIGNIFICANT CHANGE UP (ref 3.8–10.5)
WBC # FLD AUTO: 4.57 K/UL — SIGNIFICANT CHANGE UP (ref 3.8–10.5)

## 2020-07-24 PROCEDURE — 99285 EMERGENCY DEPT VISIT HI MDM: CPT | Mod: CS,25,GC

## 2020-07-24 PROCEDURE — 93010 ELECTROCARDIOGRAM REPORT: CPT | Mod: CS,GC

## 2020-07-24 PROCEDURE — 71045 X-RAY EXAM CHEST 1 VIEW: CPT | Mod: 26

## 2020-07-24 NOTE — ED PROVIDER NOTE - PR
no dermatitis, no environmental allergies, no food allergies, no immunosuppressive disorder, and no pruritus.
150

## 2020-07-24 NOTE — ED PROVIDER NOTE - PHYSICAL EXAMINATION
GEN: Well appearing  HEENT: NCAT, MMM, Trachea midline, normal conjunctiva, perrl  CHEST/LUNGS: Non-tachypneic, CTAB, bilateral breath sounds  CARDIAC: Non-tachycardic, normal perfusion  ABDOMEN: Soft, NTND, No rebound/guarding  MSK: No edema, no gross deformity of extremities  SKIN: No rashes, no petechiae, no vesicles  NEURO: CN grossly intact, normal coordination, no focal motor or sensory deficits  PSYCH: Alert, appropriate, cooperative, with capacity and insight

## 2020-07-24 NOTE — ED PROVIDER NOTE - OBJECTIVE STATEMENT
70y F week and half of fever and chills. States sx started a day after her  had similar sx. Denies SOB, CP, n/v, abd pain. Mild nonproductive cough and sore throat. No sick contact, as been isolating at home. Taking Tylenol around the clock for fever. 70y F week and half of fever and chills. States sx started a day after her  had similar sx. Denies SOB, n/v, abd pain. Mild nonproductive cough and sore throat. No sick contact, as been isolating at home. Taking Tylenol around the clock for fever.

## 2020-07-24 NOTE — ED PROVIDER NOTE - PROGRESS NOTE DETAILS
[facility] EM/IM PGY3: Patient trop 9, COVID-19 (+). Will admit for further evaluation of chest pain and further monitoring

## 2020-07-24 NOTE — ED PROVIDER NOTE - ATTENDING CONTRIBUTION TO CARE
Attending MD Vang.  Agree with above.  Pt is a 69 yo female with pmhx of HLD, hypothyroidism, borderline DM controlled with diet and osteoarthritis.  Pt presents to ED with ~9 days fever, generalized malaise and developed cough over last sev’l days. Pt’s  is also ill. Attending MD Vang.  Agree with above.  Pt is a 71 yo female with pmhx of HLD, hypothyroidism, borderline DM controlled with diet and osteoarthritis.  Pt presents to ED with ~9 days fever, generalized malaise and developed cough over last sev’l days. Pt’s  is also ill.  Pt’s son-in-law is Dr. Paris () who started her on augmentin last week then switched to azithro (now on day 3-4 of course).  Pt started on daily ASA by son as well for concern for COVID.  She is not as ill as  who is hypoxic to high 80's.  Concern for COVID.

## 2020-07-24 NOTE — ED ADULT NURSE NOTE - OBJECTIVE STATEMENT
70y Female arrives to the ED from home c/o fever and dry cough x 13days. Pt reports a nonproductive cough, fevers, chills and sob while walking. Pt states that her and her  have not been in contact with sick people and have denied travel. Pt reports taking tylenol to control fever. A&Ox3, neuro intact, breathing unlabored with symmetrical chest rise and fall, lung  sounds clear, abd soft nontender nondistended. 20g IV placed in right AC, labs drawn and sent to lab. Pt cohorted with  in room. Pt denies headaches, numbness/tingling, cp, n/v/d, abd pain. Patient undressed and placed into gown, call bell in hand, wheelchair locked. Comfort and safety provided.

## 2020-07-24 NOTE — ED ADULT NURSE REASSESSMENT NOTE - NS ED NURSE REASSESS COMMENT FT1
Pt is AAox3. Awake. Sitting up in chair in shared room with . Pt VSS. Spo2 95% on RA. Breathing unlabored. No c/o of discomfort. Pt has intermittent cough. Pending dispo.

## 2020-07-24 NOTE — ED PROVIDER NOTE - CLINICAL SUMMARY MEDICAL DECISION MAKING FREE TEXT BOX
70F presenting with CC of viral syndrome PE: VSS, clear lungs, non focal abd Ddx: Likely viral infection, could be covid given family with suspected covid infection Plan: Labs, CXR, likely will not require admission

## 2020-07-24 NOTE — ED ADULT NURSE NOTE - NSIMPLEMENTINTERV_GEN_ALL_ED
Implemented All Universal Safety Interventions:  [place] to call system. Call bell, personal items and telephone within reach. Instruct patient to call for assistance. Room bathroom lighting operational. Non-slip footwear when patient is off stretcher. Physically safe environment: no spills, clutter or unnecessary equipment. Stretcher in lowest position, wheels locked, appropriate side rails in place.

## 2020-07-24 NOTE — ED PROVIDER NOTE - CARE PLAN
Principal Discharge DX:	Suspected COVID-19 virus infection Principal Discharge DX:	COVID-19  Secondary Diagnosis:	Chest pain

## 2020-07-25 DIAGNOSIS — U07.1 COVID-19: ICD-10-CM

## 2020-07-25 DIAGNOSIS — R05 COUGH: ICD-10-CM

## 2020-07-25 DIAGNOSIS — I10 ESSENTIAL (PRIMARY) HYPERTENSION: ICD-10-CM

## 2020-07-25 LAB
ANION GAP SERPL CALC-SCNC: 14 MMOL/L — SIGNIFICANT CHANGE UP (ref 5–17)
BUN SERPL-MCNC: 7 MG/DL — SIGNIFICANT CHANGE UP (ref 7–23)
CALCIUM SERPL-MCNC: 8.3 MG/DL — LOW (ref 8.4–10.5)
CHLORIDE SERPL-SCNC: 101 MMOL/L — SIGNIFICANT CHANGE UP (ref 96–108)
CO2 SERPL-SCNC: 22 MMOL/L — SIGNIFICANT CHANGE UP (ref 22–31)
CREAT SERPL-MCNC: 0.47 MG/DL — LOW (ref 0.5–1.3)
FERRITIN SERPL-MCNC: 247 NG/ML — HIGH (ref 15–150)
GLUCOSE SERPL-MCNC: 96 MG/DL — SIGNIFICANT CHANGE UP (ref 70–99)
HCV AB S/CO SERPL IA: 0.08 S/CO — SIGNIFICANT CHANGE UP (ref 0–0.99)
HCV AB SERPL-IMP: SIGNIFICANT CHANGE UP
POTASSIUM SERPL-MCNC: 3.2 MMOL/L — LOW (ref 3.5–5.3)
POTASSIUM SERPL-SCNC: 3.2 MMOL/L — LOW (ref 3.5–5.3)
SARS-COV-2 IGG SERPL QL IA: NEGATIVE — SIGNIFICANT CHANGE UP
SARS-COV-2 IGM SERPL IA-ACNC: 0.1 INDEX — SIGNIFICANT CHANGE UP
SODIUM SERPL-SCNC: 137 MMOL/L — SIGNIFICANT CHANGE UP (ref 135–145)

## 2020-07-25 PROCEDURE — 99222 1ST HOSP IP/OBS MODERATE 55: CPT | Mod: CS,GC

## 2020-07-25 PROCEDURE — 99223 1ST HOSP IP/OBS HIGH 75: CPT | Mod: CS

## 2020-07-25 RX ORDER — POTASSIUM CHLORIDE 20 MEQ
40 PACKET (EA) ORAL ONCE
Refills: 0 | Status: COMPLETED | OUTPATIENT
Start: 2020-07-25 | End: 2020-07-25

## 2020-07-25 RX ORDER — CEFTRIAXONE 500 MG/1
1000 INJECTION, POWDER, FOR SOLUTION INTRAMUSCULAR; INTRAVENOUS EVERY 24 HOURS
Refills: 0 | Status: DISCONTINUED | OUTPATIENT
Start: 2020-07-25 | End: 2020-07-25

## 2020-07-25 RX ORDER — SIMVASTATIN 20 MG/1
1 TABLET, FILM COATED ORAL
Qty: 0 | Refills: 0 | DISCHARGE

## 2020-07-25 RX ORDER — EZETIMIBE AND SIMVASTATIN 10; 80 MG/1; MG/1
1 TABLET, FILM COATED ORAL
Qty: 0 | Refills: 0 | DISCHARGE

## 2020-07-25 RX ORDER — LEVOTHYROXINE SODIUM 125 MCG
1 TABLET ORAL
Qty: 0 | Refills: 0 | DISCHARGE

## 2020-07-25 RX ORDER — ASPIRIN/CALCIUM CARB/MAGNESIUM 324 MG
81 TABLET ORAL DAILY
Refills: 0 | Status: DISCONTINUED | OUTPATIENT
Start: 2020-07-25 | End: 2020-07-27

## 2020-07-25 RX ORDER — LOSARTAN POTASSIUM 100 MG/1
50 TABLET, FILM COATED ORAL DAILY
Refills: 0 | Status: DISCONTINUED | OUTPATIENT
Start: 2020-07-25 | End: 2020-07-27

## 2020-07-25 RX ORDER — POTASSIUM CHLORIDE 20 MEQ
20 PACKET (EA) ORAL ONCE
Refills: 0 | Status: COMPLETED | OUTPATIENT
Start: 2020-07-25 | End: 2020-07-25

## 2020-07-25 RX ORDER — GUAIFENESIN/DEXTROMETHORPHAN 600MG-30MG
10 TABLET, EXTENDED RELEASE 12 HR ORAL
Refills: 0 | Status: DISCONTINUED | OUTPATIENT
Start: 2020-07-25 | End: 2020-07-27

## 2020-07-25 RX ORDER — EZETIMIBE 10 MG/1
1 TABLET ORAL
Qty: 0 | Refills: 0 | DISCHARGE

## 2020-07-25 RX ORDER — ENOXAPARIN SODIUM 100 MG/ML
40 INJECTION SUBCUTANEOUS DAILY
Refills: 0 | Status: DISCONTINUED | OUTPATIENT
Start: 2020-07-25 | End: 2020-07-27

## 2020-07-25 RX ORDER — LEVOTHYROXINE SODIUM 125 MCG
50 TABLET ORAL DAILY
Refills: 0 | Status: DISCONTINUED | OUTPATIENT
Start: 2020-07-25 | End: 2020-07-27

## 2020-07-25 RX ORDER — SIMVASTATIN 20 MG/1
20 TABLET, FILM COATED ORAL AT BEDTIME
Refills: 0 | Status: DISCONTINUED | OUTPATIENT
Start: 2020-07-25 | End: 2020-07-27

## 2020-07-25 RX ORDER — ASPIRIN/CALCIUM CARB/MAGNESIUM 324 MG
1 TABLET ORAL
Qty: 0 | Refills: 0 | DISCHARGE

## 2020-07-25 RX ORDER — LOSARTAN POTASSIUM 100 MG/1
1 TABLET, FILM COATED ORAL
Qty: 0 | Refills: 0 | DISCHARGE

## 2020-07-25 RX ADMIN — Medication 20 MILLIEQUIVALENT(S): at 05:23

## 2020-07-25 RX ADMIN — Medication 81 MILLIGRAM(S): at 11:49

## 2020-07-25 RX ADMIN — SIMVASTATIN 20 MILLIGRAM(S): 20 TABLET, FILM COATED ORAL at 20:25

## 2020-07-25 RX ADMIN — Medication 50 MICROGRAM(S): at 05:23

## 2020-07-25 RX ADMIN — Medication 40 MILLIEQUIVALENT(S): at 11:53

## 2020-07-25 RX ADMIN — Medication 10 MILLILITER(S): at 20:25

## 2020-07-25 RX ADMIN — LOSARTAN POTASSIUM 50 MILLIGRAM(S): 100 TABLET, FILM COATED ORAL at 05:23

## 2020-07-25 RX ADMIN — Medication 10 MILLILITER(S): at 05:22

## 2020-07-25 RX ADMIN — ENOXAPARIN SODIUM 40 MILLIGRAM(S): 100 INJECTION SUBCUTANEOUS at 11:49

## 2020-07-25 NOTE — PROGRESS NOTE ADULT - ASSESSMENT
Impression :   69 y/o woman with COVID 19 pneumonitis  - at present no respiratory compromise      Recommend :   Follow pulse oximetry   Follow inflammatory markers   Dexamethasone as ordered      Michael Pendleton MD, FCCP  [facility]

## 2020-07-25 NOTE — H&P ADULT - NSHPLABSRESULTS_GEN_ALL_CORE
Personally reviewed labs.   Personally reviewed imaging.   Personally reviewed EKG.                           12.7   4.57  )-----------( 247      ( 24 Jul 2020 19:17 )             39.7       07-24    138  |  102  |  11  ----------------------------<  102<H>  3.4<L>   |  25  |  0.63    Ca    8.8      24 Jul 2020 19:17    TPro  7.8  /  Alb  4.6  /  TBili  0.6  /  DBili  x   /  AST  25  /  ALT  23  /  AlkPhos  74  07-24            LIVER FUNCTIONS - ( 24 Jul 2020 19:17 )  Alb: 4.6 g/dL / Pro: 7.8 g/dL / ALK PHOS: 74 U/L / ALT: 23 U/L / AST: 25 U/L / GGT: x

## 2020-07-25 NOTE — CONSULT NOTE ADULT - SUBJECTIVE AND OBJECTIVE BOX
CHIEF COMPLAINT:    HPI:  70F bilingual Swazi speaking, c hx hypothyroidism, HLD, prediabetes, pw 10 days fevers, chills, cough, pleuritic chest pain.    Pt states she and  spend half their time here and half their time in [place]. Pt and pt's  have been visiting their children and last saw them about 1.5 weeks ago. Pt states her children had covid earlier this year. Pt's son-in-law and pmd is Dr. Paris. Pt has been wearing mask her entire stay. Pt's  first had symptoms of fever, cough, SOB. Pt also started to have subjective fevers, chills, dry cough, pleuritic chest pain on coughing only, poor appetite, and feeling of something being stuck in her throat. Pt was started on aspirin and augmentin about 1 week ago by her son in law.    VS: Tm 99.5, P 73, /80, R 20, 95% RA (25 Jul 2020 00:56)      PAST MEDICAL & SURGICAL HISTORY:  PALMA (dyspnea on exertion)  Arthritis  Hypothyroid  HLD (hyperlipidemia)  H/O unilateral oophorectomy  S/P bilateral breast reduction  History of tonsillectomy  H/O spinal fusion          PREVIOUS DIAGNOSTIC TESTING:    [ ] Echocardiogram:  [ ]  Catheterization:  [ ] Stress Test:  	    MEDICATIONS:  MEDICATIONS  (STANDING):  aspirin enteric coated 81 milliGRAM(s) Oral daily  enoxaparin Injectable 40 milliGRAM(s) SubCutaneous daily  levothyroxine 50 MICROGram(s) Oral daily  losartan 50 milliGRAM(s) Oral daily  simvastatin 20 milliGRAM(s) Oral at bedtime      FAMILY HISTORY:  Family history of rheumatic heart disease  Family history of heart disease (Sibling)  Family history of lung cancer      SOCIAL HISTORY:    [ ] Non-smoker  [ ] Smoker  [ ] Alcohol    Allergies    No Known Allergies    Intolerances    	    REVIEW OF SYSTEMS:  CONSTITUTIONAL: No fever, weight loss, or fatigue  EYES: No eye pain, visual disturbances, or discharge  ENMT:  No difficulty hearing, tinnitus, vertigo; No sinus or throat pain  NECK: No pain or stiffness  RESPIRATORY: see hpi  CARDIOVASCULAR: see hpi  GASTROINTESTINAL: No abdominal or epigastric pain. No nausea, vomiting, or hematemesis; No diarrhea or constipation. No melena or hematochezia.  GENITOURINARY: No dysuria, frequency, hematuria, or incontinence  NEUROLOGICAL: No headaches, memory loss, loss of strength, numbness, or tremors  SKIN: No itching, burning, rashes, or lesions   	    [ ] All others negative	  [ ] Unable to obtain    PHYSICAL EXAM:  T(C): 37.3 (07-25-20 @ 11:50), Max: 37.5 (07-24-20 @ 17:37)  HR: 73 (07-25-20 @ 11:50) (70 - 83)  BP: 123/76 (07-25-20 @ 11:50) (123/76 - 166/91)  RR: 18 (07-25-20 @ 11:50) (18 - 20)  SpO2: 94% (07-25-20 @ 11:50) (93% - 98%)  Wt(kg): --  I&O's Summary    24 Jul 2020 07:01  -  25 Jul 2020 07:00  --------------------------------------------------------  IN: 180 mL / OUT: 700 mL / NET: -520 mL        Appearance: Normal	  Psychiatry: A & O x 3, Mood & affect appropriate  HEENT:   Normal oral mucosa, PERRL, EOMI	  Lymphatic: No lymphadenopathy  Cardiovascular: Normal S1 S2,RRR, No JVD, No murmurs  Respiratory: Lungs clear to auscultation	  Gastrointestinal:  Soft, Non-tender, + BS	  Skin: No rashes, No ecchymoses, No cyanosis	  Neurologic: Non-focal  Extremities: Normal range of motion, No clubbing, cyanosis or edema  Vascular: Peripheral pulses palpable 2+ bilaterally    TELEMETRY: 	    ECG:  	  RADIOLOGY:  OTHER: 	  	  LABS:	 	    CARDIAC MARKERS:                                  12.7   4.57  )-----------( 247      ( 24 Jul 2020 19:17 )             39.7     07-25    137  |  101  |  7   ----------------------------<  96  3.2<L>   |  22  |  0.47<L>    Ca    8.3<L>      25 Jul 2020 05:42    TPro  7.8  /  Alb  4.6  /  TBili  0.6  /  DBili  x   /  AST  25  /  ALT  23  /  AlkPhos  74  07-24      proBNP:   Lipid Profile:   HgA1c:   TSH:     ASSESSMENT/PLAN: 	    MEDICATIONS  (STANDING):  aspirin enteric coated 81 milliGRAM(s) Oral daily  enoxaparin Injectable 40 milliGRAM(s) SubCutaneous daily  levothyroxine 50 MICROGram(s) Oral daily  losartan 50 milliGRAM(s) Oral daily  simvastatin 20 milliGRAM(s) Oral at bedtime

## 2020-07-25 NOTE — PROGRESS NOTE ADULT - SUBJECTIVE AND OBJECTIVE BOX
Pulmonary Medicine     69 y/o woman presented with cough, fever, chills and chest pain with deep inspiration. COVID PCR was + (as is her ). No shortness of breath at rest.  No history of chronic pulmonary disease.     PAST MEDICAL & SURGICAL HISTORY:  PALMA (dyspnea on exertion)  Arthritis  Hypothyroid  HLD (hyperlipidemia)  H/O unilateral oophorectomy  S/P bilateral breast reduction  History of tonsillectomy  H/O spinal fusion    Home Medications:  aspirin 81 mg oral delayed release tablet: 1 tab(s) orally once a day (25 Jul 2020 00:55)  Augmentin 875 mg-125 mg oral tablet: 1 tab(s) orally (25 Jul 2020 00:55)  losartan 50 mg oral tablet: 1 tab(s) orally once a day (25 Jul 2020 00:55)  Synthroid 50 mcg (0.05 mg) oral tablet: 1 tab(s) orally once a day (25 Jul 2020 00:55)  Vytorin 10 mg-20 mg oral tablet: 1 tab(s) orally once a day (25 Jul 2020 00:55)    Vital Signs Last 24 Hrs  T(C): 37.3 (25 Jul 2020 11:50), Max: 37.5 (24 Jul 2020 17:37)  T(F): 99.1 (25 Jul 2020 11:50), Max: 99.5 (24 Jul 2020 17:37)  HR: 73 (25 Jul 2020 11:50) (70 - 83)  BP: 123/76 (25 Jul 2020 11:50) (123/76 - 166/91)  BP(mean): --  RR: 18 (25 Jul 2020 11:50) (18 - 20)  SpO2: 94% (25 Jul 2020 11:50) (93% - 98%)    Physical examination   Alert and oriented X three   No evident distress at rest   On room air  Heart sounds were regular   Few rhonchi noted, no wheezing  The abdomen was soft and not tender  No cyanosis   No dependent edema     Chest X-Ray 7/24 - clear                          12.7   4.57  )-----------( 247      ( 24 Jul 2020 19:17 )             39.7     07-25    137  |  101  |  7   ----------------------------<  96  3.2<L>   |  22  |  0.47<L>    Ca    8.3<L>      25 Jul 2020 05:42    TPro  7.8  /  Alb  4.6  /  TBili  0.6  /  DBili  x   /  AST  25  /  ALT  23  /  AlkPhos  74  07-24

## 2020-07-25 NOTE — H&P ADULT - NSHPPHYSICALEXAM_GEN_ALL_CORE
PHYSICAL EXAM:   GENERAL: Alert. Not confused. No acute distress. Not thin. Not cachectic. Not obese.  HEAD:  Atraumatic. Normocephalic.  EYES: EOMI. PERRLA. Normal conjunctiva/sclera.  ENT: Neck supple. No JVD. Moist oral mucosa. Not edentulous. No thrush.  LYMPH: Normal supraclavicular/cervical lymph nodes.   CARDIAC: Not tachy, Not joe. Regular rhythm. Not irregularly irregular. S1. S2. No murmur. No rub. No distant heart sounds.  LUNG/CHEST: CTAB. BS equal bilaterally. No wheezes. No rales. No rhonchi.  ABDOMEN: Soft. No tenderness. No distension. No fluid wave. Normal bowel sounds.  BACK: No midline/vertebral tenderness. No flank tenderness.  VASCULAR: +2 b/l radial or ulnar pulses. Palpable DP pulses.  EXTREMITIES:  No clubbing. No cyanosis. No edema. Moving all 4.  NEUROLOGY: A&Ox3. Non-focal exam. Cranial nerves intact. Normal speech. Sensation intact.  PSYCH: Normal behavior. Normal affect.  SKIN: No jaundice. No erythema. No rash/lesion.  Vascular Access:     ICU Vital Signs Last 24 Hrs  T(C): 37.1 (25 Jul 2020 00:03), Max: 37.5 (24 Jul 2020 17:37)  T(F): 98.7 (25 Jul 2020 00:03), Max: 99.5 (24 Jul 2020 17:37)  HR: 83 (25 Jul 2020 00:03) (70 - 83)  BP: 166/91 (25 Jul 2020 00:03) (127/80 - 166/91)  BP(mean): --  ABP: --  ABP(mean): --  RR: 18 (25 Jul 2020 00:03) (18 - 20)  SpO2: 98% (25 Jul 2020 00:03) (95% - 98%)      I&O's Summary

## 2020-07-25 NOTE — H&P ADULT - NSICDXFAMILYHX_GEN_ALL_CORE_FT
FAMILY HISTORY:  Family history of lung cancer  Family history of rheumatic heart disease    Sibling  Still living? Yes, Estimated age: Age Unknown  Family history of heart disease, Age at diagnosis: Age Unknown

## 2020-07-25 NOTE — CONSULT NOTE ADULT - ASSESSMENT
70 F bilingual Welsh speaking, c hx hypothyroidism, HLD, prediabetes, pw 10 days fevers, chills, cough, pleuritic chest pain, found to be COVID positive.    COVID-19 infection  -no fevers, no hypoxia  -CXR unremarkable, Procal low  -no need for remdesivir, no need for abx  -symptomatic management

## 2020-07-25 NOTE — CONSULT NOTE ADULT - ASSESSMENT
Covid Pneumonitis  Pleuritic Chest Pain  HTN    -CV stable  -No evidence of significant hypoxia  -chest discomfort likely related to COVID infection  -trend inflamm markers  -ECHO when feasible to r/o LV dysfunction  -BP optimal cont losartan  -cont aspirin  -cont statin  -pulm/ID followup

## 2020-07-25 NOTE — H&P ADULT - NSHPSOCIALHISTORY_GEN_ALL_CORE
Social History:    Marital Status: ( x ) , (  ) Single, (  ) , (  ) , (  )   # of Children: 2 daughters  Lives with: (  ) alone, (  ) children, ( x ) spouse, (  ) parents, (  ) siblings, (  ) friends, (  ) other:   Occupation:     Substance Use/Illicit Drugs: (  ) never used vs other:   Tobacco Usage: ( x ) never smoked, (  ) former smoker, (  ) current smoker and Total Pack-Years:   Last Alcohol Usage/Frequency/Amount/Withdrawal/Hx of Abuse:  small glass of wine daily, last drink about 2 weeks ago  Foreign travel:   Animal exposure:

## 2020-07-25 NOTE — H&P ADULT - NSICDXPASTMEDICALHX_GEN_ALL_CORE_FT
PAST MEDICAL HISTORY:  Arthritis     PALMA (dyspnea on exertion)     HLD (hyperlipidemia)     Hypothyroid

## 2020-07-25 NOTE — H&P ADULT - NSHPREVIEWOFSYSTEMS_GEN_ALL_CORE
REVIEW OF SYSTEMS:  CONSTITUTIONAL: No weakness. +fevers. +chills. No rigors. No weight loss. No night sweats. +poor appetite.  EYES: No blurry or double vision. No eye pain.  ENT: No hearing difficulty. No vertigo. No dysphagia. No sore throat. No Sinusitis/rhinorrhea.   NECK: No pain. No stiffness/rigidity.  CARDIAC: +pleuritic chest pain. No palpitations. No lightheadedness. No syncope.  RESPIRATORY: +cough. No SOB. No hemoptysis.  GASTROINTESTINAL: No abdominal pain. No nausea. No vomiting. No hematemesis. No diarrhea. No constipation. No melena. No hematochezia.  GENITOURINARY: No dysuria. No frequency. No hesitancy. No hematuria. No oliguria.  NEUROLOGICAL: No numbness/tingling. No focal weakness. No urinary or fecal incontinence. No headache. No unsteady gait.  BACK: No back pain. No flank pain.  EXTREMITIES: No lower extremity edema. Full ROM. No joint pain.  SKIN: No rashes. No itching. No other lesions.  PSYCHIATRIC: No depression. No anxiety. No SI/HI.  ALLERGIC: No lip swelling. No hives.  All other review of systems is negative unless indicated above.  Unless indicated above, unable to assess ROS 2/2

## 2020-07-25 NOTE — CONSULT NOTE ADULT - SUBJECTIVE AND OBJECTIVE BOX
Chief Complaint:  Patient is a 70y old  Female who presents with a chief complaint of cough, pleuritic chest pain c/o some nausea and vomiting as well ? cough induced  70F bilingual Cook Islander speaking, c hx hypothyroidism, HLD, prediabetes, pw 10 days fevers, chills, cough, pleuritic chest pain.    Pt states she and  spend half their time here and half their time in [place]. Pt and pt's  have been visiting their children and last saw them about 1.5 weeks ago. Pt states her children had covid earlier this year. Pt's son-in-law and pmd is Dr. Paris. Pt has been wearing mask her entire stay. Pt's  first had symptoms of fever, cough, SOB. Pt also started to have subjective fevers, chills, dry cough, pleuritic chest pain on coughing only, poor appetite, and feeling of something being stuck in her throat. Pt was started on aspirin and augmentin about 1 week ago by her son in law.    VS: Tm 99.5, P 73, /80, R 20, 95% RA       Review of Systems:  Review of Systems: REVIEW OF SYSTEMS:  CONSTITUTIONAL: No weakness. +fevers. +chills. No rigors. No weight loss. No night sweats. +poor appetite.  EYES: No blurry or double vision. No eye pain.  ENT: No hearing difficulty. No vertigo. No dysphagia. No sore throat. No Sinusitis/rhinorrhea.   NECK: No pain. No stiffness/rigidity.  CARDIAC: +pleuritic chest pain. No palpitations. No lightheadedness. No syncope.  RESPIRATORY: +cough. No SOB. No hemoptysis.  GASTROINTESTINAL: No abdominal pain. No nausea. No vomiting. No hematemesis. No diarrhea. No constipation. No melena. No hematochezia.  GENITOURINARY: No dysuria. No frequency. No hesitancy. No hematuria. No oliguria.  NEUROLOGICAL: No numbness/tingling. No focal weakness. No urinary or fecal incontinence. No headache. No unsteady gait.  BACK: No back pain. No flank pain.  EXTREMITIES: No lower extremity edema. Full ROM. No joint pain.  SKIN: No rashes. No itching. No other lesions.  PSYCHIATRIC: No depression. No anxiety. No SI/HI.  ALLERGIC: No lip swelling. No hives.  All other review of systems is negative unless indicated above. Unless indicated above, unable to assess ROS 2/2	      Allergies:  No Known Allergies      Medications:  aspirin enteric coated 81 milliGRAM(s) Oral daily  enoxaparin Injectable 40 milliGRAM(s) SubCutaneous daily  guaifenesin/dextromethorphan  Syrup 10 milliLiter(s) Oral four times a day PRN  levothyroxine 50 MICROGram(s) Oral daily  losartan 50 milliGRAM(s) Oral daily  potassium chloride    Tablet ER 40 milliEquivalent(s) Oral once  simvastatin 20 milliGRAM(s) Oral at bedtime      PMHX/PSHX:  PALMA (dyspnea on exertion)  Arthritis  Hypothyroid  HLD (hyperlipidemia)  H/O unilateral oophorectomy  S/P bilateral breast reduction  History of tonsillectomy  H/O spinal fusion      Family history:  Family history of rheumatic heart disease  Family history of heart disease (Sibling)  Family history of lung cancer      Social History:     ROS:     General:  No wt loss, fevers, chills, night sweats, fatigue,   Eyes:  Good vision, no reported pain  ENT:  No sore throat, pain, runny nose, dysphagia  CV:  No pain, palpitations, hypo/hypertension  Resp:  No dyspnea, cough, tachypnea, wheezing  GI:  No pain, No nausea, No vomiting, No diarrhea, No constipation, No weight loss, No fever, No pruritis, No rectal bleeding, No tarry stools, No dysphagia,  :  No pain, bleeding, incontinence, nocturia  Muscle:  No pain, weakness  Neuro:  No weakness, tingling, memory problems  Psych:  No fatigue, insomnia, mood problems, depression  Endocrine:  No polyuria, polydipsia, cold/heat intolerance  Heme:  No petechiae, ecchymosis, easy bruisability  Skin:  No rash, tattoos, scars, edema      PHYSICAL EXAM:   Vital Signs:  Vital Signs Last 24 Hrs  T(C): 37.3 (25 Jul 2020 11:50), Max: 37.5 (24 Jul 2020 17:37)  T(F): 99.1 (25 Jul 2020 11:50), Max: 99.5 (24 Jul 2020 17:37)  HR: 73 (25 Jul 2020 11:50) (70 - 83)  BP: 123/76 (25 Jul 2020 11:50) (123/76 - 166/91)  BP(mean): --  RR: 18 (25 Jul 2020 11:50) (18 - 20)  SpO2: 94% (25 Jul 2020 11:50) (93% - 98%)  Daily Height in cm: 162.56 (25 Jul 2020 00:03)    Daily     GENERAL:  Appears stated age, well-groomed, well-nourished, no distress  HEENT:  NC/AT,  conjunctivae clear and pink, no thyromegaly, nodules, adenopathy, no JVD, sclera -anicteric  CHEST:  Full & symmetric excursion, no increased effort, breath sounds clear  HEART:  Regular rhythm, S1, S2, no murmur/rub/S3/S4, no abdominal bruit, no edema  ABDOMEN:  Soft, non-tender, non-distended, normoactive bowel sounds,  no masses ,no hepato-splenomegaly, no signs of chronic liver disease  EXTEREMITIES:  no cyanosis,clubbing or edema  SKIN:  No rash/erythema/ecchymoses/petechiae/wounds/abscess/warm/dry  NEURO:  Alert, oriented, no asterixis, no tremor, no encephalopathy    LABS:                        12.7   4.57  )-----------( 247      ( 24 Jul 2020 19:17 )             39.7     07-25    137  |  101  |  7   ----------------------------<  96  3.2<L>   |  22  |  0.47<L>    Ca    8.3<L>      25 Jul 2020 05:42    TPro  7.8  /  Alb  4.6  /  TBili  0.6  /  DBili  x   /  AST  25  /  ALT  23  /  AlkPhos  74  07-24    LIVER FUNCTIONS - ( 24 Jul 2020 19:17 )  Alb: 4.6 g/dL / Pro: 7.8 g/dL / ALK PHOS: 74 U/L / ALT: 23 U/L / AST: 25 U/L / GGT: x                   Imaging:

## 2020-07-25 NOTE — CONSULT NOTE ADULT - SUBJECTIVE AND OBJECTIVE BOX
Patient is a 70y old  Female who presents with a chief complaint of cough, pleuritic chest pain (25 Jul 2020 11:17)    HPI:  70 F bilingual Italian speaking, c hx hypothyroidism, HLD, prediabetes, pw 10 days fevers, chills, cough, pleuritic chest pain. Pt states that her children who do not live with her and her  had symptomatic COVID back in march. Pt had one day of fevers, chills, weakness in march which she thought was COVID, did not seek further testing and resolved in 1-2 days. Patients  started feeling unwell around 7/13/20, pt states she may have had symptoms at the time but did not notice until about 10 days ago. Describes significant weakness, dry cough, fevers up 100.0 at home.    In the hospital Pt afebrile, SPO2 93-98 on RA, no leukocytosis. CXR unremarkable, Ferritin 247, COVID PCR positive, Procal 0.04.    Pt denies dysuria, diarrhea, skin changes.  Was feeling slightly better yesterday, worse today, which is similar to past 10 days.        prior hospital charts reviewed [ x ]  primary team notes reviewed [x  ]  other consultant notes reviewed [x  ]  PAST MEDICAL & SURGICAL HISTORY:  PALMA (dyspnea on exertion)  Arthritis  Hypothyroid  HLD (hyperlipidemia)  H/O unilateral oophorectomy  S/P bilateral breast reduction  History of tonsillectomy  H/O spinal fusion    Allergies  No Known Allergies    ANTIMICROBIALS (past 90 days)  MEDICATIONS  (STANDING):      ANTIMICROBIALS:      OTHER MEDS: MEDICATIONS  (STANDING):  aspirin enteric coated 81 daily  enoxaparin Injectable 40 daily  guaifenesin/dextromethorphan  Syrup 10 four times a day PRN  levothyroxine 50 daily  losartan 50 daily  simvastatin 20 at bedtime    SOCIAL HISTORY:   denies smoking, significant alcohol use    FAMILY HISTORY:  Family history of rheumatic heart disease  Family history of heart disease (Sibling)  Family history of lung cancer    REVIEW OF SYSTEMS  [  ] ROS unobtainable because:    [  x] All other systems negative except as noted below:	    Constitutional:  [ x] fever [x ] chills  [ ] weight loss  [x ] weakness  Skin:  [ ] rash [ ] phlebitis	  Eyes: [ ] icterus [ ] pain  [ ] discharge	  ENMT: [ ] sore throat  [ ] thrush [ ] ulcers [ ] exudates  Respiratory: [ x] dyspnea [ ] hemoptysis [x cough [ ] sputum	  Cardiovascular:  [ ] chest pain [ ] palpitations [ ] edema	  Gastrointestinal:  [ ] nausea [ ] vomiting [ ] diarrhea [ ] constipation [ ] pain	  Genitourinary:  [ ] dysuria [ ] frequency [ ] hematuria [ ] discharge [ ] flank pain  [ ] incontinence  Musculoskeletal:  [ ] myalgias [ ] arthralgias [ ] arthritis  [ ] back pain  Neurological:  [ ] headache [ ] seizures  [ ] confusion/altered mental status  Psychiatric:  [ ] anxiety [ ] depression	  Hematology/Lymphatics:  [ ] lymphadenopathy  Endocrine:  [ ] adrenal [ ] thyroid  Allergic/Immunologic:	 [ ] transplant [ ] seasonal    Vital Signs Last 24 Hrs  T(F): 99.2 (07-25-20 @ 05:14), Max: 99.5 (07-24-20 @ 17:37)  Vital Signs Last 24 Hrs  HR: 76 (07-25-20 @ 05:14) (70 - 83)  BP: 128/78 (07-25-20 @ 05:14) (127/80 - 166/91)  RR: 19 (07-25-20 @ 05:14)  SpO2: 93% (07-25-20 @ 05:14) (93% - 98%)  Wt(kg): --    PHYSICAL EXAM:  Constitutional: somewhat weak appearing, dry cough, no tachypnea  HEAD/EYES: anicteric, no conjunctival injection  ENT:  supple, no thrush  Cardiovascular:   normal S1, S2, no murmur, no edema  Respiratory:  clear BS bilaterally, no wheezes, no rales  GI:  soft, non-tender, normal bowel sounds  :  no lawrence, no CVA tenderness  Musculoskeletal:  no synovitis, normal ROM  Neurologic: awake and alert, normal strength, no focal findings  Skin:  no rash, no erythema, no phlebitis  Heme/Onc: no lymphadenopathy   Psychiatric:  awake, alert, appropriate mood                            12.7   4.57  )-----------( 247      ( 24 Jul 2020 19:17 )             39.7     07-25    137  |  101  |  7   ----------------------------<  96  3.2<L>   |  22  |  0.47<L>    Ca    8.3<L>      25 Jul 2020 05:42    TPro  7.8  /  Alb  4.6  /  TBili  0.6  /  DBili  x   /  AST  25  /  ALT  23  /  AlkPhos  74  07-24    MICROBIOLOGY:  COVID-19  Antibody - for prior infection screening (07.25.20 @ 09:22)    COVID-19 IgG Antibody Index: 0.10: Roche ECLIA Total AB (EDD)  NOTE: This result index represents a total antibody measurement,  which  includes IgG, IgA, and IgM  Negative <= 0.99 Index  Positive >= 1.00 Index Index    COVID-19 PCR . (07.24.20 @ 19:17)    COVID-19 PCR: Detected:                 RADIOLOGY:  < from: Xray Chest 1 View AP/PA (07.24.20 @ 18:59) >  IMPRESSION:    Clear lungs. No pleural effusion or pneumothorax. Patient is a 70y old  Female who presents with a chief complaint of cough, pleuritic chest pain (25 Jul 2020 11:17)    HPI:  70 F bilingual South African speaking, c hx hypothyroidism, HLD, prediabetes, pw 10 days fevers, chills, cough, pleuritic chest pain. Pt states that her children who do not live with her and her  had symptomatic COVID back in march. Pt had one day of fevers, chills, weakness in march which she thought was COVID, did not seek further testing and resolved in 1-2 days. Patients  started feeling unwell around 7/13/20, pt states she may have had symptoms at the time but did not notice until about 10 days ago. Describes significant weakness, dry cough, fevers up 100.0 at home.    In the hospital Pt afebrile, SPO2 93-98 on RA, no leukocytosis. CXR unremarkable, Ferritin 247, COVID PCR positive, Procal 0.04.    Pt denies dysuria, diarrhea, skin changes.  Was feeling slightly better yesterday, worse today, which is similar to past 10 days.        prior hospital charts reviewed [ x ]  primary team notes reviewed [x  ]  other consultant notes reviewed [x  ]  PAST MEDICAL & SURGICAL HISTORY:  PALMA (dyspnea on exertion)  Arthritis  Hypothyroid  HLD (hyperlipidemia)  H/O unilateral oophorectomy  S/P bilateral breast reduction  History of tonsillectomy  H/O spinal fusion    Allergies  No Known Allergies    ANTIMICROBIALS (past 90 days)  MEDICATIONS  (STANDING):      ANTIMICROBIALS:      OTHER MEDS: MEDICATIONS  (STANDING):  aspirin enteric coated 81 daily  enoxaparin Injectable 40 daily  guaifenesin/dextromethorphan  Syrup 10 four times a day PRN  levothyroxine 50 daily  losartan 50 daily  simvastatin 20 at bedtime    SOCIAL HISTORY:  from [facility], , denies smoking, significant alcohol use, no recent travel    FAMILY HISTORY:  Family history of rheumatic heart disease  Family history of heart disease (Sibling)  Family history of lung cancer    REVIEW OF SYSTEMS  [  ] ROS unobtainable because:    [  x] All other systems negative except as noted below:	    Constitutional:  [ x] fever [x ] chills  [ ] weight loss  [x ] weakness  Skin:  [ ] rash [ ] phlebitis	  Eyes: [ ] icterus [ ] pain  [ ] discharge	  ENMT: [ ] sore throat  [ ] thrush [ ] ulcers [ ] exudates  Respiratory: [ x] dyspnea [ ] hemoptysis [x cough [ ] sputum	  Cardiovascular:  [ ] chest pain [ ] palpitations [ ] edema	  Gastrointestinal:  [ ] nausea [ ] vomiting [ ] diarrhea [ ] constipation [ ] pain	  Genitourinary:  [ ] dysuria [ ] frequency [ ] hematuria [ ] discharge [ ] flank pain  [ ] incontinence  Musculoskeletal:  [ ] myalgias [ ] arthralgias [ ] arthritis  [ ] back pain  Neurological:  [ ] headache [ ] seizures  [ ] confusion/altered mental status  Psychiatric:  [ ] anxiety [ ] depression	  Hematology/Lymphatics:  [ ] lymphadenopathy  Endocrine:  [ ] adrenal [ ] thyroid  Allergic/Immunologic:	 [ ] transplant [ ] seasonal    Vital Signs Last 24 Hrs  T(F): 99.2 (07-25-20 @ 05:14), Max: 99.5 (07-24-20 @ 17:37)  Vital Signs Last 24 Hrs  HR: 76 (07-25-20 @ 05:14) (70 - 83)  BP: 128/78 (07-25-20 @ 05:14) (127/80 - 166/91)  RR: 19 (07-25-20 @ 05:14)  SpO2: 93% (07-25-20 @ 05:14) (93% - 98%)  Wt(kg): --    PHYSICAL EXAM:  Constitutional: somewhat weak appearing, dry cough, no tachypnea  HEAD: atraumatic, normocephalic  EYES: anicteric, no conjunctival injection  ENT:  supple, no thrush  Cardiovascular:   normal S1, S2, no murmur, no edema  Respiratory:  clear BS bilaterally, no wheezes, no rales  GI:  soft, non-tender, normal bowel sounds  :  no lawrence, no CVA tenderness  Musculoskeletal:  no synovitis, normal ROM  Neurologic: awake and alert, normal strength, no focal findings  Skin:  no rash, no erythema, no phlebitis  Heme/Onc: no lymphadenopathy   Psychiatric:  awake, alert, appropriate mood                            12.7   4.57  )-----------( 247      ( 24 Jul 2020 19:17 )             39.7     07-25    137  |  101  |  7   ----------------------------<  96  3.2<L>   |  22  |  0.47<L>    Ca    8.3<L>      25 Jul 2020 05:42    TPro  7.8  /  Alb  4.6  /  TBili  0.6  /  DBili  x   /  AST  25  /  ALT  23  /  AlkPhos  74  07-24    MICROBIOLOGY:  COVID-19  Antibody - for prior infection screening (07.25.20 @ 09:22)    COVID-19 IgG Antibody Index: 0.10: Roche ECLIA Total AB (EDD)  NOTE: This result index represents a total antibody measurement,  which  includes IgG, IgA, and IgM  Negative <= 0.99 Index  Positive >= 1.00 Index Index    COVID-19 PCR . (07.24.20 @ 19:17)    COVID-19 PCR: Detected:                 RADIOLOGY:  < from: Xray Chest 1 View AP/PA (07.24.20 @ 18:59) >  IMPRESSION:    Clear lungs. No pleural effusion or pneumothorax.

## 2020-07-25 NOTE — CONSULT NOTE ADULT - ATTENDING COMMENTS
70 f with hypothyroidism, HLD, prediabetes, pw 10 days fevers, chills, cough, pleuritic chest pain, found to be COVID positive.  here afebrile, normal WBC, low procalcitonin  CXR: clear    COVID 19    * no fever or hypoxia  * monitor off antibiotics  * no need for remdesivir  * monitor the O2sat    The above assessment and plan was discussed with the primary team    Viviane Encarnacion MD  Pager [phone]  After 5pm and on weekends call [phone]

## 2020-07-25 NOTE — PROGRESS NOTE ADULT - SUBJECTIVE AND OBJECTIVE BOX
CHIEF COMPLAINT:Patient is a 70y old  Female who presents with a chief complaint of cough, pleuritic chest pain (25 Jul 2020 00:56)    	        PAST MEDICAL & SURGICAL HISTORY:  PALMA (dyspnea on exertion)  Arthritis  Hypothyroid  HLD (hyperlipidemia)  H/O unilateral oophorectomy  S/P bilateral breast reduction  History of tonsillectomy  H/O spinal fusion          REVIEW OF SYSTEMS:  weak  RESPIRATORY: No cough, wheezing, chills or hemoptysis;  palma  CARDIOVASCULAR: chest discomfort at times  GASTROINTESTINAL: No abdominal or epigastric pain. No nausea, vomiting, or hematemesis; No diarrhea or constipation. No melena or hematochezia.  GENITOURINARY: No dysuria, frequency, hematuria, or incontinence  NEUROLOGICAL: No headaches, memory loss, loss of strength, numbness, or tremors    MUSCULOSKELETAL: No joint pain or swelling; No muscle, back, or extremity pain    Medications:  MEDICATIONS  (STANDING):  aspirin enteric coated 81 milliGRAM(s) Oral daily  enoxaparin Injectable 40 milliGRAM(s) SubCutaneous daily  levothyroxine 50 MICROGram(s) Oral daily  losartan 50 milliGRAM(s) Oral daily  potassium chloride    Tablet ER 40 milliEquivalent(s) Oral once  simvastatin 20 milliGRAM(s) Oral at bedtime    MEDICATIONS  (PRN):  guaifenesin/dextromethorphan  Syrup 10 milliLiter(s) Oral four times a day PRN Cough    	    PHYSICAL EXAM:  T(C): 37.3 (07-25-20 @ 05:14), Max: 37.5 (07-24-20 @ 17:37)  HR: 76 (07-25-20 @ 05:14) (70 - 83)  BP: 128/78 (07-25-20 @ 05:14) (127/80 - 166/91)  RR: 19 (07-25-20 @ 05:14) (18 - 20)  SpO2: 93% (07-25-20 @ 05:14) (93% - 98%)  Wt(kg): --  I&O's Summary    24 Jul 2020 07:01  -  25 Jul 2020 07:00  --------------------------------------------------------  IN: 180 mL / OUT: 700 mL / NET: -520 mL        Appearance: Normal	  HEENT:   Normal oral mucosa, PERRL, EOMI	  Lymphatic: No lymphadenopathy  Cardiovascular: Normal S1 S2, No JVD, No murmurs, No edema  Respiratory: Lungs clear to auscultation	  Psychiatry: A & O x 3, Mood & affect appropriate  Gastrointestinal:  Soft, Non-tender, + BS	  Skin: No rashes, No ecchymoses, No cyanosis	  Neurologic: Non-focal  Extremities: Normal range of motion, No clubbing, cyanosis or edema  Vascular: Peripheral pulses palpable 2+ bilaterally    TELEMETRY: 	    ECG:  	  RADIOLOGY:  OTHER: 	  	  LABS:	 	    CARDIAC MARKERS:                                12.7   4.57  )-----------( 247      ( 24 Jul 2020 19:17 )             39.7     07-25    137  |  101  |  7   ----------------------------<  96  3.2<L>   |  22  |  0.47<L>    Ca    8.3<L>      25 Jul 2020 05:42    TPro  7.8  /  Alb  4.6  /  TBili  0.6  /  DBili  x   /  AST  25  /  ALT  23  /  AlkPhos  74  07-24    proBNP:   Lipid Profile:   HgA1c:   TSH:

## 2020-07-25 NOTE — PROGRESS NOTE ADULT - ASSESSMENT
70 y /o F  c hx hypothyroidism, HLD, prediabetes, pw covid infection/atypical chest pain     Problem/Plan - 1:  ·  Problem: 2019 novel coronavirus disease (COVID-19).   monitor sats  - cont O2 as needed  - airborne isolation  - cont aspirin ppx.   id eval       Problem/Plan - 2:  ·  Problem: Cough.   , dextromethorphan. prn     Problem/Plan - 3:  ·  Problem: Essential hypertension.  Plan: - cont losartan.     atypical chest pain  f/u trops  cards eval

## 2020-07-25 NOTE — H&P ADULT - HISTORY OF PRESENT ILLNESS
70F bilingual Bulgarian speaking, c hx hypothyroidism, HLD, prediabetes, pw 10 days fevers, chills, cough, pleuritic chest pain.    Pt states she and  spend half their time here and half their time in [place]. Pt and pt's  have been visiting their children and last saw them about 1.5 weeks ago. Pt states her children had covid earlier this year. Pt's son-in-law and pmd is Dr. Paris. Pt has been wearing mask her entire stay. Pt's  first had symptoms of fever, cough, SOB. Pt also started to have subjective fevers, chills, dry cough, pleuritic chest pain on coughing only, poor appetite, and feeling of something being stuck in her throat. Pt was started on aspirin and augmentin about 1 week ago by her son in law.    VS: Tm 99.5, P 73, /80, R 20, 95% RA

## 2020-07-25 NOTE — H&P ADULT - NSICDXPASTSURGICALHX_GEN_ALL_CORE_FT
PAST SURGICAL HISTORY:  H/O spinal fusion     H/O unilateral oophorectomy     History of tonsillectomy     S/P bilateral breast reduction

## 2020-07-26 LAB
ANION GAP SERPL CALC-SCNC: 13 MMOL/L — SIGNIFICANT CHANGE UP (ref 5–17)
BUN SERPL-MCNC: 8 MG/DL — SIGNIFICANT CHANGE UP (ref 7–23)
CALCIUM SERPL-MCNC: 8.7 MG/DL — SIGNIFICANT CHANGE UP (ref 8.4–10.5)
CHLORIDE SERPL-SCNC: 101 MMOL/L — SIGNIFICANT CHANGE UP (ref 96–108)
CO2 SERPL-SCNC: 22 MMOL/L — SIGNIFICANT CHANGE UP (ref 22–31)
CREAT SERPL-MCNC: 0.52 MG/DL — SIGNIFICANT CHANGE UP (ref 0.5–1.3)
GLUCOSE SERPL-MCNC: 98 MG/DL — SIGNIFICANT CHANGE UP (ref 70–99)
HCT VFR BLD CALC: 39.4 % — SIGNIFICANT CHANGE UP (ref 34.5–45)
HGB BLD-MCNC: 12.4 G/DL — SIGNIFICANT CHANGE UP (ref 11.5–15.5)
MCHC RBC-ENTMCNC: 28.2 PG — SIGNIFICANT CHANGE UP (ref 27–34)
MCHC RBC-ENTMCNC: 31.5 GM/DL — LOW (ref 32–36)
MCV RBC AUTO: 89.5 FL — SIGNIFICANT CHANGE UP (ref 80–100)
NRBC # BLD: 0 /100 WBCS — SIGNIFICANT CHANGE UP (ref 0–0)
PLATELET # BLD AUTO: 228 K/UL — SIGNIFICANT CHANGE UP (ref 150–400)
POTASSIUM SERPL-MCNC: 3.5 MMOL/L — SIGNIFICANT CHANGE UP (ref 3.5–5.3)
POTASSIUM SERPL-SCNC: 3.5 MMOL/L — SIGNIFICANT CHANGE UP (ref 3.5–5.3)
RBC # BLD: 4.4 M/UL — SIGNIFICANT CHANGE UP (ref 3.8–5.2)
RBC # FLD: 12.9 % — SIGNIFICANT CHANGE UP (ref 10.3–14.5)
SODIUM SERPL-SCNC: 136 MMOL/L — SIGNIFICANT CHANGE UP (ref 135–145)
WBC # BLD: 4.39 K/UL — SIGNIFICANT CHANGE UP (ref 3.8–10.5)
WBC # FLD AUTO: 4.39 K/UL — SIGNIFICANT CHANGE UP (ref 3.8–10.5)

## 2020-07-26 RX ORDER — POTASSIUM CHLORIDE 20 MEQ
20 PACKET (EA) ORAL ONCE
Refills: 0 | Status: COMPLETED | OUTPATIENT
Start: 2020-07-26 | End: 2020-07-26

## 2020-07-26 RX ADMIN — Medication 81 MILLIGRAM(S): at 11:00

## 2020-07-26 RX ADMIN — LOSARTAN POTASSIUM 50 MILLIGRAM(S): 100 TABLET, FILM COATED ORAL at 06:04

## 2020-07-26 RX ADMIN — Medication 50 MICROGRAM(S): at 06:04

## 2020-07-26 RX ADMIN — Medication 20 MILLIEQUIVALENT(S): at 11:00

## 2020-07-26 RX ADMIN — ENOXAPARIN SODIUM 40 MILLIGRAM(S): 100 INJECTION SUBCUTANEOUS at 11:00

## 2020-07-26 RX ADMIN — SIMVASTATIN 20 MILLIGRAM(S): 20 TABLET, FILM COATED ORAL at 22:55

## 2020-07-26 RX ADMIN — Medication 10 MILLILITER(S): at 22:55

## 2020-07-26 RX ADMIN — Medication 10 MILLILITER(S): at 06:05

## 2020-07-26 NOTE — PROGRESS NOTE ADULT - ASSESSMENT
covid 19  gerd  n/v    gerd precautions  in and out  ppi once a day  may need  upper gastrointestinal endoscopy when stable  advance diet

## 2020-07-26 NOTE — PROGRESS NOTE ADULT - ASSESSMENT
70 y /o F  c hx hypothyroidism, HLD, prediabetes, pw covid infection/atypical chest pain     Problem/Plan - 1:  ·  Problem: 2019 novel coronavirus disease (COVID-19).   monitor sats  - cont O2 as needed  - airborne isolation  - cont aspirin ppx.   id eval       Problem/Plan - 2:  ·  Problem: Cough.   , dextromethorphan. prn     Problem/Plan - 3:  ·  Problem: Essential hypertension.  Plan: - cont losartan.     atypical chest pain  cards eval noted    d/c planning in next 24 hours

## 2020-07-26 NOTE — PROGRESS NOTE ADULT - ASSESSMENT
Covid Pneumonitis  Pleuritic Chest Pain  HTN    -CV stable  -No evidence of significant hypoxia  -chest discomfort likely related to COVID infection versus GI process/GERD  -trend inflamm markers  -ECHO when feasible to r/o LV dysfunction  -BP optimal cont losartan  -cont aspirin  -cont statin  -pulm/ID/GI followup

## 2020-07-26 NOTE — PROGRESS NOTE ADULT - ASSESSMENT
Impression :   Pneumonia secondary to COVID 19  - doing well on room air   - no respiratory distress     Recommend :   Follow pulse oximetry   - supplemental oxygen as/if needed  DVT prophylaxis   No need for remdesivir       Michael Pendleton MD, FCCP  [facility]

## 2020-07-26 NOTE — PROGRESS NOTE ADULT - SUBJECTIVE AND OBJECTIVE BOX
INTERVAL HPI/OVERNIGHT EVENTS:  No new overnight event.  No N/V/D.  Tolerating diet.  still with cough chest pain still there gerd has improved    Allergies    No Known Allergies    Intolerances    General:  No wt loss, fevers, chills, night sweats, fatigue,   Eyes:  Good vision, no reported pain  ENT:  No sore throat, pain, runny nose, dysphagia  CV:  No pain, palpitations, hypo/hypertension  Resp:  No dyspnea, cough, tachypnea, wheezing  GI:  No pain, No nausea, No vomiting, No diarrhea, No constipation, No weight loss, No fever, No pruritis, No rectal bleeding, No tarry stools, No dysphagia,  :  No pain, bleeding, incontinence, nocturia  Muscle:  No pain, weakness  Neuro:  No weakness, tingling, memory problems  Psych:  No fatigue, insomnia, mood problems, depression  Endocrine:  No polyuria, polydipsia, cold/heat intolerance  Heme:  No petechiae, ecchymosis, easy bruisability  Skin:  No rash, tattoos, scars, edema      PHYSICAL EXAM:   Vital Signs:  Vital Signs Last 24 Hrs  T(C): 37.2 (26 Jul 2020 04:45), Max: 37.3 (25 Jul 2020 11:50)  T(F): 99 (26 Jul 2020 04:45), Max: 99.1 (25 Jul 2020 11:50)  HR: 71 (26 Jul 2020 04:45) (71 - 80)  BP: 131/79 (26 Jul 2020 04:45) (113/81 - 131/79)  BP(mean): --  RR: 18 (26 Jul 2020 04:45) (18 - 18)  SpO2: 93% (26 Jul 2020 04:45) (93% - 97%)  Daily     Daily I&O's Summary      GENERAL:  Appears stated age, well-groomed, well-nourished, no distress  HEENT:  NC/AT,  conjunctivae clear and pink, no thyromegaly, nodules, adenopathy, no JVD, sclera -anicteric  CHEST:  Full & symmetric excursion, no increased effort, breath sounds clear  HEART:  Regular rhythm, S1, S2, no murmur/rub/S3/S4, no abdominal bruit, no edema  ABDOMEN:  Soft, non-tender, non-distended, normoactive bowel sounds,  no masses ,no hepato-splenomegaly, no signs of chronic liver disease  EXTEREMITIES:  no cyanosis,clubbing or edema  SKIN:  No rash/erythema/ecchymoses/petechiae/wounds/abscess/warm/dry  NEURO:  Alert, oriented, no asterixis, no tremor, no encephalopathy      LABS:                        12.4   4.39  )-----------( 228      ( 26 Jul 2020 06:14 )             39.4     07-26    136  |  101  |  8   ----------------------------<  98  3.5   |  22  |  0.52    Ca    8.7      26 Jul 2020 06:14    TPro  7.8  /  Alb  4.6  /  TBili  0.6  /  DBili  x   /  AST  25  /  ALT  23  /  AlkPhos  74  07-24        amylase   lipase  RADIOLOGY & ADDITIONAL TESTS:

## 2020-07-26 NOTE — PROGRESS NOTE ADULT - SUBJECTIVE AND OBJECTIVE BOX
Pulmonary Medicine     Feels better but still coughing productively. No chest pain this am. No shortness of breath at rest.     Vital Signs Last 24 Hrs  T(C): 37.2 (26 Jul 2020 04:45), Max: 37.3 (25 Jul 2020 11:50)  T(F): 99 (26 Jul 2020 04:45), Max: 99.1 (25 Jul 2020 11:50)  HR: 71 (26 Jul 2020 04:45) (71 - 80)  BP: 131/79 (26 Jul 2020 04:45) (113/81 - 131/79)  BP(mean): --  RR: 18 (26 Jul 2020 04:45) (18 - 18)  SpO2: 93% (26 Jul 2020 04:45) (93% - 97%)    Physical examination   Alert and oriented X three   Resting in bed in no apparent distress   - on room air   Heart sounds were regular   Minimal crackles in both lungs  The abdomen was soft and not tender   Skin was warm   No cyanosis                           12.4   4.39  )-----------( 228      ( 26 Jul 2020 06:14 )             39.4     MEDICATIONS  (STANDING):  aspirin enteric coated 81 milliGRAM(s) Oral daily  enoxaparin Injectable 40 milliGRAM(s) SubCutaneous daily  levothyroxine 50 MICROGram(s) Oral daily  losartan 50 milliGRAM(s) Oral daily  potassium chloride    Tablet ER 20 milliEquivalent(s) Oral once  simvastatin 20 milliGRAM(s) Oral at bedtime

## 2020-07-26 NOTE — PROGRESS NOTE ADULT - SUBJECTIVE AND OBJECTIVE BOX
CC: no events, + cough/cp    TELEMETRY:     PHYSICAL EXAM:    T(C): 37.2 (07-26-20 @ 04:45), Max: 37.3 (07-25-20 @ 11:50)  HR: 71 (07-26-20 @ 04:45) (71 - 80)  BP: 131/79 (07-26-20 @ 04:45) (113/81 - 131/79)  RR: 18 (07-26-20 @ 04:45) (18 - 18)  SpO2: 93% (07-26-20 @ 04:45) (93% - 97%)  Wt(kg): --  I&O's Summary      Appearance: Normal	  Cardiovascular: Normal S1 S2,RRR, No JVD, No murmurs  Respiratory: Lungs clear to auscultation	  Gastrointestinal:  Soft, Non-tender, + BS	  Extremities: Normal range of motion, No clubbing, cyanosis or edema  Vascular: Peripheral pulses palpable 2+ bilaterally     LABS:	 	                          12.4   4.39  )-----------( 228      ( 26 Jul 2020 06:14 )             39.4     07-26    136  |  101  |  8   ----------------------------<  98  3.5   |  22  |  0.52    Ca    8.7      26 Jul 2020 06:14    TPro  7.8  /  Alb  4.6  /  TBili  0.6  /  DBili  x   /  AST  25  /  ALT  23  /  AlkPhos  74  07-24          CARDIAC MARKERS:

## 2020-07-26 NOTE — PROGRESS NOTE ADULT - SUBJECTIVE AND OBJECTIVE BOX
CHIEF COMPLAINT:Patient is a 70y old  Female who presents with a chief complaint of cough, pleuritic chest pain (26 Jul 2020 10:21)    	        PAST MEDICAL & SURGICAL HISTORY:  PALMA (dyspnea on exertion)  Arthritis  Hypothyroid  HLD (hyperlipidemia)  H/O unilateral oophorectomy  S/P bilateral breast reduction  History of tonsillectomy  H/O spinal fusion          REVIEW OF SYSTEMS:  weak  RESPIRATORY:++cough/ palma  CARDIOVASCULAR: No chest pain, palpitations, passing out, dizziness, or leg swelling  GASTROINTESTINAL: No abdominal or epigastric pain. No nausea, vomiting, or hematemesis; No diarrhea or constipation. No melena or hematochezia.  GENITOURINARY: No dysuria, frequency, hematuria, or incontinence  NEUROLOGICAL: No headaches, memory loss, loss of strength, numbness, or tremors    MUSCULOSKELETAL: No joint pain or swelling; No muscle, back, or extremity pain    Medications:MEDICATIONS  (STANDING):  aspirin enteric coated 81 milliGRAM(s) Oral daily  enoxaparin Injectable 40 milliGRAM(s) SubCutaneous daily  levothyroxine 50 MICROGram(s) Oral daily  losartan 50 milliGRAM(s) Oral daily  simvastatin 20 milliGRAM(s) Oral at bedtime    MEDICATIONS  (PRN):  guaifenesin/dextromethorphan  Syrup 10 milliLiter(s) Oral four times a day PRN Cough    	    PHYSICAL EXAM:  T(C): 37.2 (07-26-20 @ 04:45), Max: 37.3 (07-25-20 @ 11:50)  HR: 71 (07-26-20 @ 04:45) (71 - 80)  BP: 131/79 (07-26-20 @ 04:45) (113/81 - 131/79)  RR: 18 (07-26-20 @ 04:45) (18 - 18)  SpO2: 93% (07-26-20 @ 04:45) (93% - 97%)  Wt(kg): --  I&O's Summary      Appearance: Normal	  HEENT:   Normal oral mucosa, PERRL, EOMI	  Lymphatic: No lymphadenopathy  Cardiovascular: Normal S1 S2, No JVD, No murmurs, No edema  Respiratory: dec bs   Psychiatry: A & O x 3, Mood & affect appropriate  Gastrointestinal:  Soft, Non-tender, + BS	  Skin: No rashes, No ecchymoses, No cyanosis	  Neurologic: Non-focal  Extremities: Normal range of motion, No clubbing, cyanosis or edema  Vascular: Peripheral pulses palpable 2+ bilaterally    TELEMETRY: 	    ECG:  	  RADIOLOGY:  OTHER: 	  	  LABS:	 	    CARDIAC MARKERS:                                12.4   4.39  )-----------( 228      ( 26 Jul 2020 06:14 )             39.4     07-26    136  |  101  |  8   ----------------------------<  98  3.5   |  22  |  0.52    Ca    8.7      26 Jul 2020 06:14    TPro  7.8  /  Alb  4.6  /  TBili  0.6  /  DBili  x   /  AST  25  /  ALT  23  /  AlkPhos  74  07-24    proBNP:   Lipid Profile:   HgA1c:   TSH:

## 2020-07-27 ENCOUNTER — TRANSCRIPTION ENCOUNTER (OUTPATIENT)
Age: 70
End: 2020-07-27

## 2020-07-27 VITALS
TEMPERATURE: 99 F | SYSTOLIC BLOOD PRESSURE: 123 MMHG | DIASTOLIC BLOOD PRESSURE: 86 MMHG | RESPIRATION RATE: 18 BRPM | HEART RATE: 92 BPM | OXYGEN SATURATION: 95 %

## 2020-07-27 LAB
ANION GAP SERPL CALC-SCNC: 17 MMOL/L — SIGNIFICANT CHANGE UP (ref 5–17)
BUN SERPL-MCNC: 7 MG/DL — SIGNIFICANT CHANGE UP (ref 7–23)
CALCIUM SERPL-MCNC: 8.8 MG/DL — SIGNIFICANT CHANGE UP (ref 8.4–10.5)
CHLORIDE SERPL-SCNC: 100 MMOL/L — SIGNIFICANT CHANGE UP (ref 96–108)
CO2 SERPL-SCNC: 22 MMOL/L — SIGNIFICANT CHANGE UP (ref 22–31)
CREAT SERPL-MCNC: 0.55 MG/DL — SIGNIFICANT CHANGE UP (ref 0.5–1.3)
GLUCOSE SERPL-MCNC: 98 MG/DL — SIGNIFICANT CHANGE UP (ref 70–99)
HCT VFR BLD CALC: 38.6 % — SIGNIFICANT CHANGE UP (ref 34.5–45)
HGB BLD-MCNC: 12.7 G/DL — SIGNIFICANT CHANGE UP (ref 11.5–15.5)
MAGNESIUM SERPL-MCNC: 2.2 MG/DL — SIGNIFICANT CHANGE UP (ref 1.6–2.6)
MCHC RBC-ENTMCNC: 29.3 PG — SIGNIFICANT CHANGE UP (ref 27–34)
MCHC RBC-ENTMCNC: 32.9 GM/DL — SIGNIFICANT CHANGE UP (ref 32–36)
MCV RBC AUTO: 89.1 FL — SIGNIFICANT CHANGE UP (ref 80–100)
NRBC # BLD: 0 /100 WBCS — SIGNIFICANT CHANGE UP (ref 0–0)
PLATELET # BLD AUTO: 241 K/UL — SIGNIFICANT CHANGE UP (ref 150–400)
POTASSIUM SERPL-MCNC: 3.7 MMOL/L — SIGNIFICANT CHANGE UP (ref 3.5–5.3)
POTASSIUM SERPL-SCNC: 3.7 MMOL/L — SIGNIFICANT CHANGE UP (ref 3.5–5.3)
RBC # BLD: 4.33 M/UL — SIGNIFICANT CHANGE UP (ref 3.8–5.2)
RBC # FLD: 12.8 % — SIGNIFICANT CHANGE UP (ref 10.3–14.5)
SODIUM SERPL-SCNC: 139 MMOL/L — SIGNIFICANT CHANGE UP (ref 135–145)
WBC # BLD: 4.81 K/UL — SIGNIFICANT CHANGE UP (ref 3.8–10.5)
WBC # FLD AUTO: 4.81 K/UL — SIGNIFICANT CHANGE UP (ref 3.8–10.5)

## 2020-07-27 PROCEDURE — 80048 BASIC METABOLIC PNL TOTAL CA: CPT

## 2020-07-27 PROCEDURE — 83735 ASSAY OF MAGNESIUM: CPT

## 2020-07-27 PROCEDURE — 71045 X-RAY EXAM CHEST 1 VIEW: CPT

## 2020-07-27 PROCEDURE — 82728 ASSAY OF FERRITIN: CPT

## 2020-07-27 PROCEDURE — 80053 COMPREHEN METABOLIC PANEL: CPT

## 2020-07-27 PROCEDURE — 84484 ASSAY OF TROPONIN QUANT: CPT

## 2020-07-27 PROCEDURE — 86769 SARS-COV-2 COVID-19 ANTIBODY: CPT

## 2020-07-27 PROCEDURE — 86140 C-REACTIVE PROTEIN: CPT

## 2020-07-27 PROCEDURE — 85027 COMPLETE CBC AUTOMATED: CPT

## 2020-07-27 PROCEDURE — 93005 ELECTROCARDIOGRAM TRACING: CPT

## 2020-07-27 PROCEDURE — 84145 PROCALCITONIN (PCT): CPT

## 2020-07-27 PROCEDURE — 85379 FIBRIN DEGRADATION QUANT: CPT

## 2020-07-27 PROCEDURE — 86803 HEPATITIS C AB TEST: CPT

## 2020-07-27 PROCEDURE — 99285 EMERGENCY DEPT VISIT HI MDM: CPT | Mod: 25

## 2020-07-27 RX ADMIN — Medication 81 MILLIGRAM(S): at 11:12

## 2020-07-27 RX ADMIN — Medication 50 MICROGRAM(S): at 05:11

## 2020-07-27 RX ADMIN — LOSARTAN POTASSIUM 50 MILLIGRAM(S): 100 TABLET, FILM COATED ORAL at 05:11

## 2020-07-27 RX ADMIN — ENOXAPARIN SODIUM 40 MILLIGRAM(S): 100 INJECTION SUBCUTANEOUS at 11:12

## 2020-07-27 NOTE — DISCHARGE NOTE NURSING/CASE MANAGEMENT/SOCIAL WORK - PATIENT PORTAL LINK FT
You can access the FollowMyHealth Patient Portal offered by [facility] by registering at the following website: [email or web address]. By joining [facility]’s FollowMyHealth portal, you will also be able to view your health information using other applications (apps) compatible with our system.

## 2020-07-27 NOTE — PROGRESS NOTE ADULT - SUBJECTIVE AND OBJECTIVE BOX
Pulmonary Follow up Note     Pneumonia and Respiratory  failure due to COVID 19     CXR is suspicious for peripheral infiltrates    She is mildly hypoxic    comfortable      MEDICATIONS  (STANDING):  aspirin enteric coated 81 milliGRAM(s) Oral daily  enoxaparin Injectable 40 milliGRAM(s) SubCutaneous daily  guaiFENesin  milliGRAM(s) Oral every 12 hours  levothyroxine 50 MICROGram(s) Oral daily  losartan 50 milliGRAM(s) Oral daily  simvastatin 20 milliGRAM(s) Oral at bedtime      D-Dimer Assay, Quantitative: 195:     C-Reactive Protein, Serum: 0.65 mg/dL (07.24.20 @ 19:17)        Ferritin, Serum: 247 ng/mL (07.24.20 @ 23:08)                          12.7   4.81  )-----------( 241      ( 27 Jul 2020 06:41 )             38.6       07-27    139  |  100  |  7   ----------------------------<  98  3.7   |  22  |  0.55    Ca    8.8      27 Jul 2020 06:41  Mg     2.2     07-27    Vital Signs Last 24 Hrs  T(C): 36.8 (27 Jul 2020 11:06), Max: 37.1 (26 Jul 2020 19:43)  T(F): 98.2 (27 Jul 2020 11:06), Max: 98.8 (26 Jul 2020 19:43)  HR: 78 (27 Jul 2020 11:06) (70 - 83)  BP: 117/79 (27 Jul 2020 11:06) (116/75 - 132/84)  BP(mean): --  RR: 18 (27 Jul 2020 11:06) (18 - 18)  SpO2: 92% (27 Jul 2020 11:06) (92% - 96%)    respirations  unlabored, no dyspnea  abdomen soft NT  extremities no edema  pulses regular  alert conversant       IMPRESSION     COVID-19 infection   possible pneumonia     inflammatory markers have been low    RECOMMEND     wean oxygen   continue NC    repeat d dimer      Labs, meds radiographic studies reviewed    Michael Cruz MD    PULMONARY    MD Michael Quiñones MD George Haralambou, MD    [phone]

## 2020-07-27 NOTE — DISCHARGE NOTE PROVIDER - NSDCMRMEDTOKEN_GEN_ALL_CORE_FT
aspirin 81 mg oral delayed release tablet: 1 tab(s) orally once a day  guaiFENesin 600 mg oral tablet, extended release: 1 tab(s) orally every 12 hours  losartan 50 mg oral tablet: 1 tab(s) orally once a day  Synthroid 50 mcg (0.05 mg) oral tablet: 1 tab(s) orally once a day  Vytorin 10 mg-20 mg oral tablet: 1 tab(s) orally once a day

## 2020-07-27 NOTE — PROGRESS NOTE ADULT - ASSESSMENT
Covid Pneumonitis  Pleuritic Chest Pain  HTN    -CV stable  -No evidence of significant hypoxia  -chest discomfort likely related to COVID infection versus GI process/GERD  -trend inflamm markers  -ECHO when feasible to r/o LV dysfunction - can be done as outpt.   -BP optimal cont losartan  -cont aspirin  -cont statin  -pulm/ID/GI followup    no cv objection to dc planning

## 2020-07-27 NOTE — DISCHARGE NOTE PROVIDER - HOSPITAL COURSE
70 y /o F  c hx hypothyroidism, HLD, prediabetes, pw covid infection/atypical chest pain. Pt did not require supplemenetal oxygen or any treatment for COVID 19 during hospitalization.    Supportive care provided     Medically stable for discharge as per Dr. Machado    Med rec and DCP discussed with Dr. Machado

## 2020-07-27 NOTE — PROGRESS NOTE ADULT - ATTENDING COMMENTS
Advanced care planning was discussed with patient and family.  Advanced care planning forms were reviewed and discussed.  Risks, benefits and alternatives of gastroenterologic procedures were discussed in detail and all questions were answered.    30 minutes spent.
Patient seen and examined.  Agree with above NP note.  cv stable and improved  improved sx  chest pain, atypical, secondary to covid/cough  no acs  echo as outpt once recovered from covid or if sx increase  no cv ci to d/c

## 2020-07-27 NOTE — PROGRESS NOTE ADULT - REASON FOR ADMISSION
cough, pleuritic chest pain

## 2020-07-27 NOTE — PROGRESS NOTE ADULT - SUBJECTIVE AND OBJECTIVE BOX
INTERVAL HPI/OVERNIGHT EVENTS:  No new overnight event.  No N/V/D.  Tolerating diet.  no pain    Allergies    No Known Allergies    Intolerances    General:  No wt loss, fevers, chills, night sweats, fatigue,   Eyes:  Good vision, no reported pain  ENT:  No sore throat, pain, runny nose, dysphagia  CV:  No pain, palpitations, hypo/hypertension  Resp:  No dyspnea, cough, tachypnea, wheezing  GI:  No pain, No nausea, No vomiting, No diarrhea, No constipation, No weight loss, No fever, No pruritis, No rectal bleeding, No tarry stools, No dysphagia,  :  No pain, bleeding, incontinence, nocturia  Muscle:  No pain, weakness  Neuro:  No weakness, tingling, memory problems  Psych:  No fatigue, insomnia, mood problems, depression  Endocrine:  No polyuria, polydipsia, cold/heat intolerance  Heme:  No petechiae, ecchymosis, easy bruisability  Skin:  No rash, tattoos, scars, edema      PHYSICAL EXAM:   Vital Signs:  Vital Signs Last 24 Hrs  T(C): 36.8 (27 Jul 2020 08:51), Max: 37.4 (26 Jul 2020 12:09)  T(F): 98.2 (27 Jul 2020 08:51), Max: 99.3 (26 Jul 2020 12:09)  HR: 79 (27 Jul 2020 08:51) (68 - 83)  BP: 116/75 (27 Jul 2020 08:51) (116/75 - 132/84)  BP(mean): --  RR: 18 (27 Jul 2020 08:51) (18 - 18)  SpO2: 93% (27 Jul 2020 08:51) (90% - 96%)  Daily     Daily I&O's Summary    26 Jul 2020 07:01  -  27 Jul 2020 07:00  --------------------------------------------------------  IN: 480 mL / OUT: 0 mL / NET: 480 mL        GENERAL:  Appears stated age, well-groomed, well-nourished, no distress  HEENT:  NC/AT,  conjunctivae clear and pink, no thyromegaly, nodules, adenopathy, no JVD, sclera -anicteric  CHEST:  Full & symmetric excursion, no increased effort, breath sounds clear  HEART:  Regular rhythm, S1, S2, no murmur/rub/S3/S4, no abdominal bruit, no edema  ABDOMEN:  Soft, non-tender, non-distended, normoactive bowel sounds,  no masses ,no hepato-splenomegaly, no signs of chronic liver disease  EXTEREMITIES:  no cyanosis,clubbing or edema  SKIN:  No rash/erythema/ecchymoses/petechiae/wounds/abscess/warm/dry  NEURO:  Alert, oriented, no asterixis, no tremor, no encephalopathy      LABS:                        12.7   4.81  )-----------( 241      ( 27 Jul 2020 06:41 )             38.6     07-27    139  |  100  |  7   ----------------------------<  98  3.7   |  22  |  0.55    Ca    8.8      27 Jul 2020 06:41  Mg     2.2     07-27          amylase   lipase  RADIOLOGY & ADDITIONAL TESTS:

## 2020-07-27 NOTE — PROGRESS NOTE ADULT - ASSESSMENT
70 y /o F  c hx hypothyroidism, HLD, prediabetes, pw covid infection/atypical chest pain     Problem/Plan - 1:  ·  Problem: 2019 novel coronavirus disease (COVID-19).   monitor sats  not requiring O2 at all  - airborne isolation  - cont aspirin ppx.   id eval       Problem/Plan - 2:  ·  Problem: Cough.   , dextromethorphan. prn     Problem/Plan - 3:  ·  Problem: Essential hypertension.  Plan: - cont losartan.     ·	atypical chest pain      discharge home

## 2020-07-27 NOTE — PROGRESS NOTE ADULT - SUBJECTIVE AND OBJECTIVE BOX
Patient is a 70y old  Female who presents with a chief complaint of cough, pleuritic chest pain (27 Jul 2020 09:13)      SUBJECTIVE / OVERNIGHT EVENTS: overnight events noted  "I want to go home"    ROS:  Resp: + occasional cough no sputum production  CVS: No chest pain no palpitations no orthopnea  GI: no N/V/D  : no dysuria, no hematuria          MEDICATIONS  (STANDING):  aspirin enteric coated 81 milliGRAM(s) Oral daily  enoxaparin Injectable 40 milliGRAM(s) SubCutaneous daily  levothyroxine 50 MICROGram(s) Oral daily  losartan 50 milliGRAM(s) Oral daily  simvastatin 20 milliGRAM(s) Oral at bedtime    MEDICATIONS  (PRN):  guaifenesin/dextromethorphan  Syrup 10 milliLiter(s) Oral four times a day PRN Cough        CAPILLARY BLOOD GLUCOSE        I&O's Summary    26 Jul 2020 07:01  -  27 Jul 2020 07:00  --------------------------------------------------------  IN: 480 mL / OUT: 0 mL / NET: 480 mL        Vital Signs Last 24 Hrs  T(C): 36.8 (27 Jul 2020 11:06), Max: 37.4 (26 Jul 2020 12:09)  T(F): 98.2 (27 Jul 2020 11:06), Max: 99.3 (26 Jul 2020 12:09)  HR: 78 (27 Jul 2020 11:06) (68 - 83)  BP: 117/79 (27 Jul 2020 11:06) (116/75 - 132/84)  BP(mean): --  RR: 18 (27 Jul 2020 11:06) (18 - 18)  SpO2: 92% (27 Jul 2020 11:06) (90% - 96%)  PE:  Appearance: Normal	  HEENT:   Normal oral mucosa, PERRL, EOMI	  Lymphatic: No lymphadenopathy  Cardiovascular: Normal S1 S2, No JVD, No murmurs, No edema  Respiratory: dec bs   Psychiatry: A & O x 3, Mood & affect appropriate  Gastrointestinal:  Soft, Non-tender, + BS	  Skin: No rashes, No ecchymoses, No cyanosis	  Neurologic: Non-focal  Extremities: Normal range of motion, No clubbing, cyanosis or edema  Vascular: Peripheral pulses palpable 2+ bilaterally    LABS:                        12.7   4.81  )-----------( 241      ( 27 Jul 2020 06:41 )             38.6     07-27    139  |  100  |  7   ----------------------------<  98  3.7   |  22  |  0.55    Ca    8.8      27 Jul 2020 06:41  Mg     2.2     07-27                  All consultant(s) notes reviewed and care discussed with other providers        Contact Number, Dr Machado [phone]

## 2020-07-27 NOTE — PROGRESS NOTE ADULT - SUBJECTIVE AND OBJECTIVE BOX
CARDIOLOGY FOLLOW UP - Dr. Fontaine    CC no cp/sob       PHYSICAL EXAM:  T(C): 36.8 (07-27-20 @ 11:06), Max: 37.1 (07-26-20 @ 19:43)  HR: 78 (07-27-20 @ 11:06) (70 - 83)  BP: 117/79 (07-27-20 @ 11:06) (116/75 - 132/84)  RR: 18 (07-27-20 @ 11:06) (18 - 18)  SpO2: 92% (07-27-20 @ 11:06) (92% - 96%)  Wt(kg): --  I&O's Summary    26 Jul 2020 07:01  -  27 Jul 2020 07:00  --------------------------------------------------------  IN: 480 mL / OUT: 0 mL / NET: 480 mL        Appearance: Normal	  Cardiovascular: Normal S1 S2,RRR, No JVD, No murmurs  Respiratory: Lungs clear to auscultation	  Gastrointestinal:  Soft, Non-tender, + BS	  Extremities: Normal range of motion, No clubbing, cyanosis or edema        MEDICATIONS  (STANDING):  aspirin enteric coated 81 milliGRAM(s) Oral daily  enoxaparin Injectable 40 milliGRAM(s) SubCutaneous daily  guaiFENesin  milliGRAM(s) Oral every 12 hours  levothyroxine 50 MICROGram(s) Oral daily  losartan 50 milliGRAM(s) Oral daily  simvastatin 20 milliGRAM(s) Oral at bedtime      TELEMETRY: nsr 	    ECG:  	  RADIOLOGY:   DIAGNOSTIC TESTING:  [ ] Echocardiogram:  [ ]  Catheterization:  [ ] Stress Test:    OTHER: 	    LABS:	 	                                12.7   4.81  )-----------( 241      ( 27 Jul 2020 06:41 )             38.6     07-27    139  |  100  |  7   ----------------------------<  98  3.7   |  22  |  0.55    Ca    8.8      27 Jul 2020 06:41  Mg     2.2     07-27

## 2020-07-27 NOTE — DISCHARGE NOTE PROVIDER - CARE PROVIDER_API CALL
Orville Paris ()  Internal Medicine  [address] [place] [address]  Phone: [phone]  Fax: [phone]  Follow Up Time:

## 2020-07-27 NOTE — DISCHARGE NOTE PROVIDER - NSDCCPCAREPLAN_GEN_ALL_CORE_FT
PRINCIPAL DISCHARGE DIAGNOSIS  Diagnosis: COVID-19  Assessment and Plan of Treatment: You tested positive for COVID 19.  You no longer require hospitalization.  Please restrict activities outside of your home except for getting medical care.  Do not go to work, school, or public areas.  Avoid using public transportation, ride-sharing, or taxis.  Separate yourself from other people and animals in your home.  Call ahead before visiting your doctor.  Wear a facemask when you are around other people. Cover your cough and sneezes.  Clean your hands often.  Avoid sharing personal household items.  Clean all frequently touched surfaces daily.   Please follow up with your primray care physician in one weeek      SECONDARY DISCHARGE DIAGNOSES  Diagnosis: Essential hypertension  Assessment and Plan of Treatment: Continue current medcation as directed    Diagnosis: Chest pain  Assessment and Plan of Treatment: Now resolved  Evaluated by cardiology   Please follow up with your primary care physician or cardiolgy for recurrent chest pain

## 2020-07-28 ENCOUNTER — TRANSCRIPTION ENCOUNTER (OUTPATIENT)
Age: 70
End: 2020-07-28

## 2020-07-29 ENCOUNTER — TRANSCRIPTION ENCOUNTER (OUTPATIENT)
Age: 70
End: 2020-07-29

## 2020-08-03 ENCOUNTER — TRANSCRIPTION ENCOUNTER (OUTPATIENT)
Age: 70
End: 2020-08-03

## 2020-08-06 ENCOUNTER — TRANSCRIPTION ENCOUNTER (OUTPATIENT)
Age: 70
End: 2020-08-06

## 2020-08-10 ENCOUNTER — APPOINTMENT (OUTPATIENT)
Dept: PULMONOLOGY | Facility: CLINIC | Age: 70
End: 2020-08-10
Payer: MEDICARE

## 2020-08-10 ENCOUNTER — TRANSCRIPTION ENCOUNTER (OUTPATIENT)
Age: 70
End: 2020-08-10

## 2020-08-10 VITALS
HEIGHT: 62 IN | TEMPERATURE: 98.7 F | WEIGHT: 184 LBS | HEART RATE: 69 BPM | OXYGEN SATURATION: 97 % | DIASTOLIC BLOOD PRESSURE: 77 MMHG | SYSTOLIC BLOOD PRESSURE: 123 MMHG | BODY MASS INDEX: 33.86 KG/M2

## 2020-08-10 VITALS — TEMPERATURE: 98 F

## 2020-08-10 DIAGNOSIS — G47.30 SLEEP APNEA, UNSPECIFIED: ICD-10-CM

## 2020-08-10 PROCEDURE — 99214 OFFICE O/P EST MOD 30 MIN: CPT | Mod: CS

## 2020-08-10 RX ORDER — LEVOTHYROXINE SODIUM 0.07 MG/1
75 TABLET ORAL DAILY
Qty: 90 | Refills: 3 | Status: COMPLETED | COMMUNITY
Start: 2019-01-07 | End: 2020-08-10

## 2020-08-10 RX ORDER — BENZONATATE 100 MG/1
100 CAPSULE ORAL
Refills: 0 | Status: ACTIVE | COMMUNITY

## 2020-08-10 RX ORDER — AZITHROMYCIN 250 MG/1
250 TABLET, FILM COATED ORAL
Qty: 6 | Refills: 0 | Status: COMPLETED | COMMUNITY
Start: 2020-07-17

## 2020-08-10 RX ORDER — LEVOTHYROXINE SODIUM 0.05 MG/1
50 TABLET ORAL
Refills: 0 | Status: ACTIVE | COMMUNITY

## 2020-08-10 RX ORDER — AMOXICILLIN 875 MG/1
875 TABLET, FILM COATED ORAL
Qty: 20 | Refills: 0 | Status: COMPLETED | COMMUNITY
Start: 2020-04-06

## 2020-08-10 RX ORDER — GUAIFENESIN AND DEXTROMETHORPHAN HYDROBROMIDE 600; 30 MG/1; MG/1
TABLET, EXTENDED RELEASE ORAL
Refills: 0 | Status: ACTIVE | COMMUNITY

## 2020-08-10 RX ORDER — ASPIRIN 81 MG
81 TABLET, DELAYED RELEASE (ENTERIC COATED) ORAL
Refills: 0 | Status: ACTIVE | COMMUNITY

## 2020-08-10 NOTE — REVIEW OF SYSTEMS
[Fever] : no fever [Fatigue] : fatigue [Chills] : no chills [Cough] : cough [Sputum] : no sputum [Dyspnea] : no dyspnea [Thyroid Problem] : thyroid problem [Negative] : Psychiatric

## 2020-08-10 NOTE — DISCUSSION/SUMMARY
[FreeTextEntry1] : 71 yo female with stable exam post covid 19 infection. PRN albuterol MDI use recommended for her cough. Treatment adjustment will depend on symptomatic needs. PFT will be repeated in the future. A sleep study will be performed as planned in the past.Follow up with her PMD as before.

## 2020-08-10 NOTE — HISTORY OF PRESENT ILLNESS
[TextBox_4] : 69 yo female presents for evaluation post covid 19 infection. The patient was admitted to [facility] ,treated symptomatically without need for supplemental oxygen.Presently she complains of PRN dry cough, which she has had since last visit nearly 1 1/2 year ago. She was prescribed albuterol MDI at the time, but did not use.She was to have a sleep study but did not go. She continues to complain of sleep related symptoms.

## 2020-08-11 ENCOUNTER — TRANSCRIPTION ENCOUNTER (OUTPATIENT)
Age: 70
End: 2020-08-11

## 2020-08-18 ENCOUNTER — TRANSCRIPTION ENCOUNTER (OUTPATIENT)
Age: 70
End: 2020-08-18

## 2020-09-11 ENCOUNTER — APPOINTMENT (OUTPATIENT)
Dept: CARDIOLOGY | Facility: CLINIC | Age: 70
End: 2020-09-11
Payer: MEDICARE

## 2020-09-11 ENCOUNTER — NON-APPOINTMENT (OUTPATIENT)
Age: 70
End: 2020-09-11

## 2020-09-11 VITALS
DIASTOLIC BLOOD PRESSURE: 78 MMHG | OXYGEN SATURATION: 98 % | SYSTOLIC BLOOD PRESSURE: 123 MMHG | HEART RATE: 80 BPM | RESPIRATION RATE: 12 BRPM

## 2020-09-11 VITALS — TEMPERATURE: 97.8 F

## 2020-09-11 PROCEDURE — 99213 OFFICE O/P EST LOW 20 MIN: CPT | Mod: 25

## 2020-09-11 RX ORDER — LOSARTAN POTASSIUM 50 MG/1
50 TABLET, FILM COATED ORAL
Qty: 90 | Refills: 0 | Status: DISCONTINUED | COMMUNITY
Start: 2020-07-07 | End: 2020-09-11

## 2020-10-26 ENCOUNTER — APPOINTMENT (OUTPATIENT)
Dept: INTERNAL MEDICINE | Facility: CLINIC | Age: 70
End: 2020-10-26

## 2020-10-27 ENCOUNTER — MED ADMIN CHARGE (OUTPATIENT)
Age: 70
End: 2020-10-27

## 2020-10-27 ENCOUNTER — APPOINTMENT (OUTPATIENT)
Dept: INTERNAL MEDICINE | Facility: CLINIC | Age: 70
End: 2020-10-27
Payer: MEDICARE

## 2020-10-27 ENCOUNTER — NON-APPOINTMENT (OUTPATIENT)
Age: 70
End: 2020-10-27

## 2020-10-27 VITALS
SYSTOLIC BLOOD PRESSURE: 133 MMHG | WEIGHT: 186 LBS | HEIGHT: 62 IN | BODY MASS INDEX: 34.23 KG/M2 | TEMPERATURE: 98.2 F | DIASTOLIC BLOOD PRESSURE: 80 MMHG | RESPIRATION RATE: 14 BRPM | HEART RATE: 76 BPM | OXYGEN SATURATION: 95 %

## 2020-10-27 DIAGNOSIS — Z00.00 ENCOUNTER FOR GENERAL ADULT MEDICAL EXAMINATION W/OUT ABNORMAL FINDINGS: ICD-10-CM

## 2020-10-27 DIAGNOSIS — U07.1 COVID-19: ICD-10-CM

## 2020-10-27 DIAGNOSIS — M54.16 RADICULOPATHY, LUMBAR REGION: ICD-10-CM

## 2020-10-27 DIAGNOSIS — Z23 ENCOUNTER FOR IMMUNIZATION: ICD-10-CM

## 2020-10-27 PROCEDURE — G0439: CPT | Mod: CS

## 2020-10-27 PROCEDURE — 36415 COLL VENOUS BLD VENIPUNCTURE: CPT

## 2020-10-27 PROCEDURE — 90686 IIV4 VACC NO PRSV 0.5 ML IM: CPT

## 2020-10-27 PROCEDURE — 93000 ELECTROCARDIOGRAM COMPLETE: CPT

## 2020-10-27 PROCEDURE — G0008: CPT

## 2020-10-28 LAB
25(OH)D3 SERPL-MCNC: 44.7 NG/ML
ALBUMIN SERPL ELPH-MCNC: 4.9 G/DL
ALP BLD-CCNC: 80 U/L
ALT SERPL-CCNC: 25 U/L
ANION GAP SERPL CALC-SCNC: 12 MMOL/L
APPEARANCE: CLEAR
AST SERPL-CCNC: 18 U/L
BACTERIA: NEGATIVE
BASOPHILS # BLD AUTO: 0.08 K/UL
BASOPHILS NFR BLD AUTO: 0.9 %
BILIRUB SERPL-MCNC: 0.9 MG/DL
BILIRUBIN URINE: NEGATIVE
BLOOD URINE: NEGATIVE
BUN SERPL-MCNC: 13 MG/DL
CALCIUM SERPL-MCNC: 9.5 MG/DL
CHLORIDE SERPL-SCNC: 103 MMOL/L
CHOLEST SERPL-MCNC: 210 MG/DL
CO2 SERPL-SCNC: 25 MMOL/L
COLOR: COLORLESS
CREAT SERPL-MCNC: 0.59 MG/DL
DEPRECATED D DIMER PPP IA-ACNC: <150 NG/ML DDU
EOSINOPHIL # BLD AUTO: 0.13 K/UL
EOSINOPHIL NFR BLD AUTO: 1.5 %
GLUCOSE QUALITATIVE U: NEGATIVE
GLUCOSE SERPL-MCNC: 87 MG/DL
HCT VFR BLD CALC: 40.9 %
HDLC SERPL-MCNC: 60 MG/DL
HGB BLD-MCNC: 12.9 G/DL
HYALINE CASTS: 0 /LPF
IMM GRANULOCYTES NFR BLD AUTO: 0.5 %
KETONES URINE: NEGATIVE
LDLC SERPL CALC-MCNC: 128 MG/DL
LEUKOCYTE ESTERASE URINE: NEGATIVE
LYMPHOCYTES # BLD AUTO: 2.53 K/UL
LYMPHOCYTES NFR BLD AUTO: 29.5 %
MAN DIFF?: NORMAL
MCHC RBC-ENTMCNC: 29.3 PG
MCHC RBC-ENTMCNC: 31.5 GM/DL
MCV RBC AUTO: 92.7 FL
MICROSCOPIC-UA: NORMAL
MONOCYTES # BLD AUTO: 0.75 K/UL
MONOCYTES NFR BLD AUTO: 8.7 %
NEUTROPHILS # BLD AUTO: 5.06 K/UL
NEUTROPHILS NFR BLD AUTO: 58.9 %
NITRITE URINE: NEGATIVE
NONHDLC SERPL-MCNC: 150 MG/DL
PH URINE: 5.5
PLATELET # BLD AUTO: 297 K/UL
POTASSIUM SERPL-SCNC: 4.3 MMOL/L
PROT SERPL-MCNC: 7.5 G/DL
PROTEIN URINE: NEGATIVE
RBC # BLD: 4.41 M/UL
RBC # FLD: 13.6 %
RED BLOOD CELLS URINE: 0 /HPF
SODIUM SERPL-SCNC: 141 MMOL/L
SPECIFIC GRAVITY URINE: 1
SQUAMOUS EPITHELIAL CELLS: 0 /HPF
T3 SERPL-MCNC: 111 NG/DL
T4 FREE SERPL-MCNC: 1.2 NG/DL
T4 SERPL-MCNC: 7.4 UG/DL
TRIGL SERPL-MCNC: 111 MG/DL
TSH SERPL-ACNC: 1.25 UIU/ML
UROBILINOGEN URINE: NORMAL
WBC # FLD AUTO: 8.59 K/UL
WHITE BLOOD CELLS URINE: 0 /HPF

## 2020-10-28 NOTE — HISTORY OF PRESENT ILLNESS
[FreeTextEntry1] : Physical examination  [de-identified] : LUZ MARIA CASTELLON is a 70 year old female patient with a PMHx of COVID-19 infection, type 2 DM, HLD, hypothyroidism, scoliosis, and sleep apnea who presents today for annual physical examination. She is feeling well but relates lower back pain. Pt requests H. Influenza vaccination.

## 2020-10-28 NOTE — PHYSICAL EXAM
[No Acute Distress] : no acute distress [Well Nourished] : well nourished [Well Developed] : well developed [Well-Appearing] : well-appearing [Normal Sclera/Conjunctiva] : normal sclera/conjunctiva [PERRL] : pupils equal round and reactive to light [EOMI] : extraocular movements intact [Normal Outer Ear/Nose] : the outer ears and nose were normal in appearance [Normal Oropharynx] : the oropharynx was normal [No JVD] : no jugular venous distention [No Lymphadenopathy] : no lymphadenopathy [Supple] : supple [Thyroid Normal, No Nodules] : the thyroid was normal and there were no nodules present [No Respiratory Distress] : no respiratory distress  [No Accessory Muscle Use] : no accessory muscle use [Clear to Auscultation] : lungs were clear to auscultation bilaterally [Normal Rate] : normal rate  [Regular Rhythm] : with a regular rhythm [Normal S1, S2] : normal S1 and S2 [No Murmur] : no murmur heard [No Carotid Bruits] : no carotid bruits [No Abdominal Bruit] : a ~M bruit was not heard ~T in the abdomen [No Varicosities] : no varicosities [Pedal Pulses Present] : the pedal pulses are present [No Edema] : there was no peripheral edema [No Palpable Aorta] : no palpable aorta [No Extremity Clubbing/Cyanosis] : no extremity clubbing/cyanosis [Normal Appearance] : normal in appearance [No Nipple Discharge] : no nipple discharge [No Axillary Lymphadenopathy] : no axillary lymphadenopathy [Soft] : abdomen soft [Non Tender] : non-tender [Non-distended] : non-distended [No Masses] : no abdominal mass palpated [No HSM] : no HSM [Normal Bowel Sounds] : normal bowel sounds [Normal Posterior Cervical Nodes] : no posterior cervical lymphadenopathy [Normal Anterior Cervical Nodes] : no anterior cervical lymphadenopathy [No CVA Tenderness] : no CVA  tenderness [No Spinal Tenderness] : no spinal tenderness [No Joint Swelling] : no joint swelling [Grossly Normal Strength/Tone] : grossly normal strength/tone [No Rash] : no rash [Coordination Grossly Intact] : coordination grossly intact [No Focal Deficits] : no focal deficits [Normal Gait] : normal gait [Normal Affect] : the affect was normal [Normal Insight/Judgement] : insight and judgment were intact [FreeTextEntry1] : deferred

## 2020-10-28 NOTE — END OF VISIT
[FreeTextEntry3] : I, Cata Pichardo, personally transcribed these services in the presence of Dr. Bhavik Tovar MD. I, Dr. Bhavik Tovar MD, personally performed the services described in this documentation as scribed by Cata Pichardo in my presence and it is both accurate and complete.

## 2020-10-28 NOTE — HEALTH RISK ASSESSMENT
[Good] : ~his/her~  mood as  good [Yes] : Yes [Monthly or less (1 pt)] : Monthly or less (1 point) [1 or 2 (0 pts)] : 1 or 2 (0 points) [Never (0 pts)] : Never (0 points) [No] : In the past 12 months have you used drugs other than those required for medical reasons? No [No falls in past year] : Patient reported no falls in the past year [0] : 2) Feeling down, depressed, or hopeless: Not at all (0) [Patient reported mammogram was normal] : Patient reported mammogram was normal [Patient reported PAP Smear was normal] : Patient reported PAP Smear was normal [Patient reported bone density results were normal] : Patient reported bone density results were normal [Patient reported colonoscopy was normal] : Patient reported colonoscopy was normal [] : No [Audit-CScore] : 1 [de-identified] : sedentary [de-identified] : regular [[id number]] : 0 [MammogramDate] : 01/2020 [PapSmearDate] : 01/2020 [BoneDensityDate] : 12/2019 [ColonoscopyDate] : 10/2019

## 2020-11-04 ENCOUNTER — TRANSCRIPTION ENCOUNTER (OUTPATIENT)
Age: 70
End: 2020-11-04

## 2020-11-13 LAB — IL6 SERPL-MCNC: <2.5 PG/ML

## 2021-07-12 ENCOUNTER — NON-APPOINTMENT (OUTPATIENT)
Age: 71
End: 2021-07-12

## 2021-07-12 NOTE — CARDIOLOGY SUMMARY
[___] : [unfilled] [LVEF ___%] : LVEF [unfilled]% [None] : no pulmonary hypertension [Normal] : normal [Mild] : mild mitral regurgitation

## 2021-07-12 NOTE — DISCUSSION/SUMMARY
[FreeTextEntry1] : IMPRESSION: Mrs. Lieberman is a 70-year-old woman with a history of hypothyroidism, hyperlipidemia, family history of coronary artery disease, and COVID infection 2 months ago who presents today for follow up of hyperlipidemia.\par \par PLAN:\par 1. She will continue on Vytorin 10-20mg daily in addition to diet modification. She had a cardiac catheterization about a year and a half ago that that revealed minimal atherosclerosis. We should aim for a goal LDL of at least less than 100.\par 2. She will follow up with me in a year or sooner should she experience any symptoms in the interim.

## 2021-07-12 NOTE — PHYSICAL EXAM
[Normal Appearance] : normal appearance [General Appearance - Well Developed] : well developed [Well Groomed] : well groomed [General Appearance - In No Acute Distress] : no acute distress [No Deformities] : no deformities [General Appearance - Well Nourished] : well nourished [Normal Conjunctiva] : the conjunctiva exhibited no abnormalities [Normal Jugular Venous A Waves Present] : normal jugular venous A waves present [No Jugular Venous Velasquez A Waves] : no jugular venous velasquez A waves [Normal Jugular Venous V Waves Present] : normal jugular venous V waves present [Respiration, Rhythm And Depth] : normal respiratory rhythm and effort [Exaggerated Use Of Accessory Muscles For Inspiration] : no accessory muscle use [Auscultation Breath Sounds / Voice Sounds] : lungs were clear to auscultation bilaterally [Bowel Sounds] : normal bowel sounds [Abdomen Tenderness] : non-tender [Abdomen Soft] : soft [Abnormal Walk] : normal gait [Nail Clubbing] : no clubbing of the fingernails [Cyanosis, Localized] : no localized cyanosis [Petechial Hemorrhages (___cm)] : no petechial hemorrhages [Skin Color & Pigmentation] : normal skin color and pigmentation [] : no rash [No Skin Ulcers] : no skin ulcer [Oriented To Time, Place, And Person] : oriented to person, place, and time [Affect] : the affect was normal [Mood] : the mood was normal [No Anxiety] : not feeling anxious [Normal Rate] : normal [Normal S1] : normal S1 [Rhythm Regular] : regular [Normal S2] : normal S2 [No Murmur] : no murmurs heard [FreeTextEntry1] : She was wearing a face mask during the examination. [S3] : no S3 [Right Carotid Bruit] : no bruit heard over the right carotid [Left Carotid Bruit] : no bruit heard over the left carotid [Bruit] : no bruit heard

## 2021-07-12 NOTE — REVIEW OF SYSTEMS
[Dyspnea on exertion] : dyspnea during exertion [Joint Pain] : joint pain [Negative] : Heme/Lymph [Chest Pain] : no chest pain

## 2021-07-12 NOTE — HISTORY OF PRESENT ILLNESS
[FreeTextEntry1] : Patient is a 70-year-old woman with a history of hypothyroidism, hyperlipidemia, and family history of coronary artery disease, recent COVID infection 2 months ago who presents today for follow up of hyperlipidemia. She states that her major issue at this time is her joint pain. She states that she is feeling better and feels that she has recovered from her COVID infection. She experiences dyspnea with exertion which is back to her baseline.  She otherwise denies any chest pain, dyspnea at rest, palpitations, dizziness, and headaches.

## 2021-10-20 NOTE — ED ADULT NURSE NOTE - NSSEPSISSUSPECTED_ED_A_ED
Received [employer] message from [facility] RN stating that patient reverted back to AFib with RVR after bronch. Sussy Sr NP notified. Orders received for IV Digoxin. Orders implemented. No

## 2021-11-10 ENCOUNTER — APPOINTMENT (OUTPATIENT)
Dept: PULMONOLOGY | Facility: CLINIC | Age: 71
End: 2021-11-10
Payer: MEDICARE

## 2021-11-10 DIAGNOSIS — R05.9 COUGH, UNSPECIFIED: ICD-10-CM

## 2021-11-10 DIAGNOSIS — U09.9 POST COVID-19 CONDITION, UNSPECIFIED: ICD-10-CM

## 2021-11-10 DIAGNOSIS — J30.2 OTHER SEASONAL ALLERGIC RHINITIS: ICD-10-CM

## 2021-11-10 PROCEDURE — 99213 OFFICE O/P EST LOW 20 MIN: CPT

## 2021-11-10 RX ORDER — ALBUTEROL SULFATE 90 UG/1
108 (90 BASE) INHALANT RESPIRATORY (INHALATION)
Qty: 1 | Refills: 1 | Status: ACTIVE | COMMUNITY
Start: 2019-03-04 | End: 1900-01-01

## 2021-11-11 VITALS
SYSTOLIC BLOOD PRESSURE: 130 MMHG | WEIGHT: 180 LBS | BODY MASS INDEX: 33.13 KG/M2 | DIASTOLIC BLOOD PRESSURE: 80 MMHG | TEMPERATURE: 98 F | HEIGHT: 62 IN | RESPIRATION RATE: 14 BRPM | OXYGEN SATURATION: 97 % | HEART RATE: 74 BPM

## 2021-11-11 PROBLEM — J30.2 SEASONAL RHINITIS: Status: ACTIVE | Noted: 2021-11-11

## 2021-11-11 PROBLEM — U09.9 POST-COVID-19 CONDITION: Status: ACTIVE | Noted: 2021-11-11

## 2021-11-11 PROBLEM — R05.9 COUGH: Status: ACTIVE | Noted: 2021-11-11

## 2021-11-11 NOTE — REVIEW OF SYSTEMS
[Thyroid Problem] : thyroid problem [Negative] : Psychiatric [Fever] : no fever [Fatigue] : no fatigue [Chills] : no chills [Nasal Congestion] : nasal congestion [Postnasal Drip] : postnasal drip [Cough] : no cough [Sputum] : no sputum [Dyspnea] : no dyspnea

## 2021-11-11 NOTE — HISTORY OF PRESENT ILLNESS
[Never] : never [TextBox_4] : 72 yo female post covid 19 infection last year with hypoxemia requiring hospitalization, presents for follow up. The patient feels " better" complaining of nasal congestion with mild PRN productive cough without wheezing or SOB. She recently returned from [place] after eleven months.Presently she denies sleep related complaints.She is covid 19 vaccinated.

## 2021-11-11 NOTE — DISCUSSION/SUMMARY
[FreeTextEntry1] : 70 yo female with stable pulmonary exam, with seasonal rhinitis and mild cough. She was given a two week sample of trelegy 200 with PRN albuterol MDI use. PTC antihistamine use also recommended. She is to follow up with hr PMD as before.

## 2021-11-18 ENCOUNTER — NON-APPOINTMENT (OUTPATIENT)
Age: 71
End: 2021-11-18

## 2021-11-18 ENCOUNTER — APPOINTMENT (OUTPATIENT)
Dept: CARDIOLOGY | Facility: CLINIC | Age: 71
End: 2021-11-18
Payer: MEDICARE

## 2021-11-18 VITALS
BODY MASS INDEX: 34.41 KG/M2 | OXYGEN SATURATION: 97 % | DIASTOLIC BLOOD PRESSURE: 84 MMHG | HEART RATE: 69 BPM | RESPIRATION RATE: 12 BRPM | SYSTOLIC BLOOD PRESSURE: 131 MMHG | WEIGHT: 187 LBS | HEIGHT: 62 IN | TEMPERATURE: 96.8 F

## 2021-11-18 DIAGNOSIS — E78.5 HYPERLIPIDEMIA, UNSPECIFIED: ICD-10-CM

## 2021-11-18 DIAGNOSIS — E03.9 HYPOTHYROIDISM, UNSPECIFIED: ICD-10-CM

## 2021-11-18 PROCEDURE — G0008: CPT

## 2021-11-18 PROCEDURE — 99214 OFFICE O/P EST MOD 30 MIN: CPT | Mod: 25

## 2021-11-18 PROCEDURE — 90662 IIV NO PRSV INCREASED AG IM: CPT

## 2021-11-18 NOTE — PHYSICAL EXAM
[General Appearance - Well Developed] : well developed [Normal Appearance] : normal appearance [Well Groomed] : well groomed [General Appearance - Well Nourished] : well nourished [No Deformities] : no deformities [General Appearance - In No Acute Distress] : no acute distress [Normal Conjunctiva] : the conjunctiva exhibited no abnormalities [Normal Jugular Venous A Waves Present] : normal jugular venous A waves present [Normal Jugular Venous V Waves Present] : normal jugular venous V waves present [No Jugular Venous Velasquez A Waves] : no jugular venous velasquez A waves [Respiration, Rhythm And Depth] : normal respiratory rhythm and effort [Exaggerated Use Of Accessory Muscles For Inspiration] : no accessory muscle use [Auscultation Breath Sounds / Voice Sounds] : lungs were clear to auscultation bilaterally [Bowel Sounds] : normal bowel sounds [Abdomen Soft] : soft [Abdomen Tenderness] : non-tender [Nail Clubbing] : no clubbing of the fingernails [Cyanosis, Localized] : no localized cyanosis [Petechial Hemorrhages (___cm)] : no petechial hemorrhages [Skin Color & Pigmentation] : normal skin color and pigmentation [] : no rash [No Skin Ulcers] : no skin ulcer [Oriented To Time, Place, And Person] : oriented to person, place, and time [Affect] : the affect was normal [Mood] : the mood was normal [No Anxiety] : not feeling anxious [Normal Rate] : normal [Rhythm Regular] : regular [Normal S1] : normal S1 [Normal S2] : normal S2 [No Murmur] : no murmurs heard [FreeTextEntry1] : She was wearing a face mask during the examination. [S3] : no S3 [Right Carotid Bruit] : no bruit heard over the right carotid [Left Carotid Bruit] : no bruit heard over the left carotid [Bruit] : no bruit heard

## 2021-11-18 NOTE — DISCUSSION/SUMMARY
[FreeTextEntry1] : IMPRESSION: Mrs. Lieberman is a 71-year-old woman with a history of hypothyroidism, hyperlipidemia, family history of coronary artery disease, and COVID infection 2 months ago who presents today for follow up of hyperlipidemia.\par \par PLAN:\par 1. She will continue on Vytorin 10-20mg daily in addition to diet modification. She had a cardiac catheterization about 2 and 1/2 years ago that that revealed minimal atherosclerosis. We should aim for a goal LDL of at least less than 100, if not close to 70.  I will be checking a CMP and lipid profile today.\par 2.  She was given a flu shot today.\par 3. She will follow up with me in a year or sooner should she experience any symptoms in the interim.\par 4.  I spent approximately 30 to 35 minutes with the patient during the encounter, including documentation.

## 2021-11-18 NOTE — HISTORY OF PRESENT ILLNESS
[FreeTextEntry1] : Patient is a 71-year-old woman with a history of hypothyroidism, hyperlipidemia, COVID infection summer of 2020, and family history of coronary artery disease who presents today for follow up of hyperlipidemia. She states that her major issue at this time is her joint pain. She experiences dyspnea with exertion which does not appear to affect her activities of daily living .  She otherwise denies any chest pain, dyspnea at rest, palpitations, dizziness, and headaches.

## 2021-11-23 LAB
25(OH)D3 SERPL-MCNC: 37.8 NG/ML
ALBUMIN SERPL ELPH-MCNC: 4.4 G/DL
ALP BLD-CCNC: 74 U/L
ALT SERPL-CCNC: 24 U/L
ANION GAP SERPL CALC-SCNC: 16 MMOL/L
APPEARANCE: CLEAR
AST SERPL-CCNC: 28 U/L
BASOPHILS # BLD AUTO: 0.06 K/UL
BASOPHILS NFR BLD AUTO: 0.9 %
BILIRUB SERPL-MCNC: 1.2 MG/DL
BILIRUBIN URINE: NEGATIVE
BLOOD URINE: NEGATIVE
BUN SERPL-MCNC: 12 MG/DL
CALCIUM SERPL-MCNC: 9.5 MG/DL
CHLORIDE SERPL-SCNC: 103 MMOL/L
CO2 SERPL-SCNC: 24 MMOL/L
COLOR: YELLOW
COVID-19 NUCLEOCAPSID  GAM ANTIBODY INTERPRETATION: POSITIVE
COVID-19 SPIKE DOMAIN ANTIBODY INTERPRETATION: POSITIVE
CREAT SERPL-MCNC: 0.62 MG/DL
CREAT SPEC-SCNC: 177 MG/DL
EOSINOPHIL # BLD AUTO: 0.1 K/UL
EOSINOPHIL NFR BLD AUTO: 1.5 %
GLUCOSE QUALITATIVE U: NEGATIVE
GLUCOSE SERPL-MCNC: 77 MG/DL
HCT VFR BLD CALC: 40.1 %
HGB BLD-MCNC: 12.6 G/DL
IMM GRANULOCYTES NFR BLD AUTO: 0.3 %
KETONES URINE: NEGATIVE
LEUKOCYTE ESTERASE URINE: NEGATIVE
LYMPHOCYTES # BLD AUTO: 2.06 K/UL
LYMPHOCYTES NFR BLD AUTO: 30.2 %
MAN DIFF?: NORMAL
MCHC RBC-ENTMCNC: 28.6 PG
MCHC RBC-ENTMCNC: 31.4 GM/DL
MCV RBC AUTO: 90.9 FL
MICROALBUMIN 24H UR DL<=1MG/L-MCNC: 1.2 MG/DL
MICROALBUMIN/CREAT 24H UR-RTO: 7 MG/G
MONOCYTES # BLD AUTO: 0.56 K/UL
MONOCYTES NFR BLD AUTO: 8.2 %
NEUTROPHILS # BLD AUTO: 4.03 K/UL
NEUTROPHILS NFR BLD AUTO: 58.9 %
NITRITE URINE: NEGATIVE
PH URINE: 6.5
PLATELET # BLD AUTO: 288 K/UL
POTASSIUM SERPL-SCNC: 4 MMOL/L
PROT SERPL-MCNC: 7.5 G/DL
PROTEIN URINE: NEGATIVE
RBC # BLD: 4.41 M/UL
RBC # FLD: 13.7 %
SARS-COV-2 AB SERPL IA-ACNC: >250 U/ML
SARS-COV-2 AB SERPL QL IA: 25.3 INDEX
SODIUM SERPL-SCNC: 142 MMOL/L
SPECIFIC GRAVITY URINE: 1.02
TSH SERPL-ACNC: 1 UIU/ML
UROBILINOGEN URINE: NORMAL
WBC # FLD AUTO: 6.83 K/UL

## 2021-11-26 ENCOUNTER — APPOINTMENT (OUTPATIENT)
Dept: RADIOLOGY | Facility: HOSPITAL | Age: 71
End: 2021-11-26

## 2021-11-26 ENCOUNTER — RESULT REVIEW (OUTPATIENT)
Age: 71
End: 2021-11-26

## 2021-11-26 ENCOUNTER — OUTPATIENT (OUTPATIENT)
Dept: OUTPATIENT SERVICES | Facility: HOSPITAL | Age: 71
LOS: 1 days | End: 2021-11-26
Payer: MEDICARE

## 2021-11-26 DIAGNOSIS — Z00.00 ENCOUNTER FOR GENERAL ADULT MEDICAL EXAMINATION WITHOUT ABNORMAL FINDINGS: ICD-10-CM

## 2021-11-26 DIAGNOSIS — Z98.1 ARTHRODESIS STATUS: Chronic | ICD-10-CM

## 2021-11-26 DIAGNOSIS — Z98.890 OTHER SPECIFIED POSTPROCEDURAL STATES: Chronic | ICD-10-CM

## 2021-11-26 DIAGNOSIS — Z90.89 ACQUIRED ABSENCE OF OTHER ORGANS: Chronic | ICD-10-CM

## 2021-11-26 DIAGNOSIS — Z90.721 ACQUIRED ABSENCE OF OVARIES, UNILATERAL: Chronic | ICD-10-CM

## 2021-11-26 PROCEDURE — 74221 X-RAY XM ESOPHAGUS 2CNTRST: CPT | Mod: 26

## 2021-11-26 PROCEDURE — 74221 X-RAY XM ESOPHAGUS 2CNTRST: CPT

## 2021-12-03 ENCOUNTER — RESULT REVIEW (OUTPATIENT)
Age: 71
End: 2021-12-03

## 2022-02-28 ENCOUNTER — TRANSCRIPTION ENCOUNTER (OUTPATIENT)
Age: 72
End: 2022-02-28

## 2022-12-08 NOTE — H&P CARDIOLOGY - RESPIRATORY AND THORAX
[facility]  Emergency Department  Faculty Attestation     I performed a history and physical examination of the patient and discussed management with the resident. I reviewed the residents note and agree with the documented findings and plan of care. Any areas of disagreement are noted on the chart. I was personally present for the key portions of any procedures. I have documented in the chart those procedures where I was not present during the key portions. I have reviewed the emergency nurses triage note. I agree with the chief complaint, past medical history, past surgical history, allergies, medications, social and family history as documented unless otherwise noted below. For Physician Assistant/ Nurse Practitioner cases/documentation I have personally evaluated this patient and have completed at least one if not all key elements of the E/M (history, physical exam, and MDM). Additional findings are as noted. Primary Care Physician:  Md Shahla Agarwal    Screenings:  [unfilled]    CHIEF COMPLAINT       Chief Complaint   Patient presents with    Back Pain    Nasal Congestion       RECENT VITALS:   Temp: 97.2 °F (36.2 °C),  Heart Rate: 63, Resp: 18, BP: 123/76    LABS:  Labs Reviewed - No data to display    Radiology  No orders to display       CRITICAL CARE: There was nonea high probability of clinically significant/life threatening deterioration in this patient's condition which required my urgent intervention. Total critical care time was none minutes. This excludes any time for separately reportable procedures. EKG:      Attending Physician Additional  Notes    Patient's had 1 week of low back pain, feels like spasm, difficulty straightening up especially after bending. No trauma. No fever chills or sweats. No saddle anesthesia. No lower extremity weakness. He points to the location across his SI joints bilaterally. No history of injury. Additionally he has had illness for the past 3 days of cough and congestion. There is some myalgias and chills but no documented fevers. No sore throat. No stiff neck or headache. He is COVID vaccinated but not influenza vaccinated. No history of asthma. On exam he is uncomfortable but nontoxic afebrile vital signs are normal.  Normal motor strength in lower extremities. Negative straight leg raising. No clonus. No midline spine tenderness. Abdomen is benign. Skin is warm and dry. Lungs are clear. Conjunctiva clear. Impression is back strain, consider sacroiliitis, muscle spasm, viral syndrome. Plan is cough medication, simple analgesics, muscle relaxants, discharged home with return precautions, limited limiting. Xavi Virk.  Melissa Lo MD, [facility]  Attending Emergency  Physician                Jameson Chow MD  12/08/22 [id number] negative

## 2023-01-04 ENCOUNTER — APPOINTMENT (OUTPATIENT)
Dept: PULMONOLOGY | Facility: CLINIC | Age: 73
End: 2023-01-04
Payer: MEDICARE

## 2023-01-04 VITALS
HEART RATE: 76 BPM | BODY MASS INDEX: 34.41 KG/M2 | TEMPERATURE: 97 F | OXYGEN SATURATION: 96 % | SYSTOLIC BLOOD PRESSURE: 150 MMHG | WEIGHT: 187 LBS | HEIGHT: 62 IN | DIASTOLIC BLOOD PRESSURE: 94 MMHG

## 2023-01-04 DIAGNOSIS — G25.81 RESTLESS LEGS SYNDROME: ICD-10-CM

## 2023-01-04 DIAGNOSIS — G47.9 SLEEP DISORDER, UNSPECIFIED: ICD-10-CM

## 2023-01-04 DIAGNOSIS — R53.83 OTHER FATIGUE: ICD-10-CM

## 2023-01-04 PROCEDURE — 99214 OFFICE O/P EST MOD 30 MIN: CPT

## 2023-01-04 NOTE — DISCUSSION/SUMMARY
[FreeTextEntry1] : 73 yo female with complaints consistent with sleep apnea. Home sleep study will be performed. The meaning of sleep apnea was discussed with the patient and her  who was present. The need for treatment, if present, was also stressed.She is to follow up with her PMD as before.

## 2023-01-04 NOTE — HISTORY OF PRESENT ILLNESS
[Never] : never [TextBox_4] : 71 yo female presents for evaluation of possible sleep apnea. The patient snores with frequent awakenings and leg movements. She complains of daytime somnolence and fatigue. [Awakes Unrefreshed] : awakes unrefreshed [Fatigue] : fatigue [Frequent Nocturnal Awakening] : frequent nocturnal awakening [Snoring] : snoring [Unusual Movements] : unusual movements

## 2023-01-04 NOTE — REVIEW OF SYSTEMS
[Fever] : no fever [Fatigue] : fatigue [Chills] : no chills [Nasal Congestion] : no nasal congestion [Postnasal Drip] : no postnasal drip [Cough] : no cough [Sputum] : no sputum [Dyspnea] : no dyspnea [Back Pain] : back pain [Thyroid Problem] : thyroid problem [Negative] : Psychiatric

## 2023-01-10 ENCOUNTER — APPOINTMENT (OUTPATIENT)
Dept: ORTHOPEDIC SURGERY | Facility: CLINIC | Age: 73
End: 2023-01-10
Payer: MEDICARE

## 2023-01-10 ENCOUNTER — NON-APPOINTMENT (OUTPATIENT)
Age: 73
End: 2023-01-10

## 2023-01-10 VITALS — HEIGHT: 63 IN | WEIGHT: 190 LBS | BODY MASS INDEX: 33.66 KG/M2

## 2023-01-10 PROCEDURE — 99203 OFFICE O/P NEW LOW 30 MIN: CPT | Mod: 25

## 2023-01-10 PROCEDURE — 73030 X-RAY EXAM OF SHOULDER: CPT | Mod: 50

## 2023-01-10 PROCEDURE — 20611 DRAIN/INJ JOINT/BURSA W/US: CPT | Mod: LT

## 2023-01-10 PROCEDURE — 72050 X-RAY EXAM NECK SPINE 4/5VWS: CPT

## 2023-01-23 ENCOUNTER — APPOINTMENT (OUTPATIENT)
Dept: OTOLARYNGOLOGY | Facility: CLINIC | Age: 73
End: 2023-01-23
Payer: MEDICARE

## 2023-01-23 VITALS
HEART RATE: 70 BPM | HEIGHT: 63 IN | BODY MASS INDEX: 33.13 KG/M2 | WEIGHT: 187 LBS | DIASTOLIC BLOOD PRESSURE: 92 MMHG | SYSTOLIC BLOOD PRESSURE: 145 MMHG

## 2023-01-23 DIAGNOSIS — H91.90 UNSPECIFIED HEARING LOSS, UNSPECIFIED EAR: ICD-10-CM

## 2023-01-23 DIAGNOSIS — K21.9 GASTRO-ESOPHAGEAL REFLUX DISEASE W/OUT ESOPHAGITIS: ICD-10-CM

## 2023-01-23 DIAGNOSIS — J31.0 CHRONIC RHINITIS: ICD-10-CM

## 2023-01-23 DIAGNOSIS — R22.1 LOCALIZED SWELLING, MASS AND LUMP, NECK: ICD-10-CM

## 2023-01-23 PROCEDURE — 92557 COMPREHENSIVE HEARING TEST: CPT

## 2023-01-23 PROCEDURE — 99203 OFFICE O/P NEW LOW 30 MIN: CPT

## 2023-01-23 PROCEDURE — 92550 TYMPANOMETRY & REFLEX THRESH: CPT

## 2023-01-23 NOTE — REVIEW OF SYSTEMS
[Post Nasal Drip] : post nasal drip [Ear Pain] : ear pain [Ear Itch] : ear itch [Throat Clearing] : throat clearing [Throat Dryness] : throat dryness [Throat Itching] : throat itching [Eyes Itch] : itching of the eyes [Cough] : cough [Heartburn] : heartburn [Joint Pain] : joint pain [Negative] : Heme/Lymph [FreeTextEntry3] : watery [FreeTextEntry6] : noisy breathing [FreeTextEntry7] : reflux [FreeTextEntry9] : muscle aches [FreeTextEntry1] : sweating at night

## 2023-01-23 NOTE — CONSULT LETTER
[Dear  ___] : Dear  [unfilled], [Consult Letter:] : I had the pleasure of evaluating your patient, [unfilled]. [Please see my note below.] : Please see my note below. [Consult Closing:] : Thank you very much for allowing me to participate in the care of this patient.  If you have any questions, please do not hesitate to contact me. [Sincerely,] : Sincerely, [FreeTextEntry3] : Maury Shanks MD FACS

## 2023-01-23 NOTE — ASSESSMENT
[FreeTextEntry1] : Reviewed and reconciled medications, allergies, PMHx, PSHx, SocHx, FMHx.\par \par Pt presents today to check ears. Pt notes sometimes her left ear feels like something is in it on and off which started a year ago. Pt notes she experiences tenderness in left ear down to her neck. \par \par Physical Exam -\par right ear: cerumen removed, small cyst inferior portion of canal\par left ear: minimal cerumen removed\par tuning fork lateralizes to the right\par severe deviated septum left, swollen turbs b/l\par left neck nodule anterior to sternoclavicular muscle at level of carotid artery\par mild tmj left\par \par Audio:\par Tymps: Type A, au\par ETF: Abnormal, au\par Hearing wnl - borderline wnl 250-8khz, au\par 100% discrim at 55db\par \par Plan:\par Cerumen removed b/l. Audio - results interpreted by Dr. Shanks and reviewed with the patient. TMJ instruction sheet and reflux diet sheet provided. US neck ordered. FU after scan.

## 2023-01-23 NOTE — HISTORY OF PRESENT ILLNESS
[de-identified] : Pt presents today to check ears. Pt notes sometimes her left ear feels like something is in it on and off which started a year ago. Pt notes she experiences tenderness in left ear down to her neck. Pt notes she has reflux. Pt denies tinnitus.

## 2023-01-23 NOTE — PHYSICAL EXAM
[Wade Test Lateralizes To Right] : tone lateralization to the right [] : septum deviated to the left [Normal] : mucosa is normal [Midline] : trachea located in midline position [FreeTextEntry1] : left neck nodule anterior to sternoclavicular muscle at level of carotid artery\par mild tmj left [FreeTextEntry8] : cerumen removed via curettage, small cyst inferior portion of canal [FreeTextEntry9] : minimal cerumen removed via curettage  [de-identified] : swollen turbs b/l [de-identified] : no tonsil tissue [FreeTextEntry2] : sinuses nontender to percussion. sensations intact.

## 2023-01-23 NOTE — DATA REVIEWED
[de-identified] : Tymps: Type A, au\par ETF: Abnormal, au\par Hearing wnl - borderline wnl 250-8khz, au\par

## 2023-01-24 ENCOUNTER — APPOINTMENT (OUTPATIENT)
Dept: ORTHOPEDIC SURGERY | Facility: CLINIC | Age: 73
End: 2023-01-24
Payer: MEDICARE

## 2023-01-24 PROCEDURE — 20611 DRAIN/INJ JOINT/BURSA W/US: CPT | Mod: RT

## 2023-01-24 PROCEDURE — 99214 OFFICE O/P EST MOD 30 MIN: CPT | Mod: 25

## 2023-01-26 ENCOUNTER — NON-APPOINTMENT (OUTPATIENT)
Age: 73
End: 2023-01-26

## 2023-01-26 ENCOUNTER — APPOINTMENT (OUTPATIENT)
Dept: CARDIOLOGY | Facility: CLINIC | Age: 73
End: 2023-01-26
Payer: MEDICARE

## 2023-01-26 VITALS
TEMPERATURE: 97.5 F | HEART RATE: 79 BPM | DIASTOLIC BLOOD PRESSURE: 85 MMHG | BODY MASS INDEX: 33.13 KG/M2 | RESPIRATION RATE: 12 BRPM | WEIGHT: 187 LBS | OXYGEN SATURATION: 96 % | SYSTOLIC BLOOD PRESSURE: 133 MMHG | HEIGHT: 63 IN

## 2023-01-26 DIAGNOSIS — R06.02 SHORTNESS OF BREATH: ICD-10-CM

## 2023-01-26 LAB
CHOLEST SERPL-MCNC: 173 MG/DL
ESTIMATED AVERAGE GLUCOSE: 134 MG/DL
FOLATE SERPL-MCNC: 19.8 NG/ML
HBA1C MFR BLD HPLC: 6.3 %
HDLC SERPL-MCNC: 56 MG/DL
LDLC SERPL CALC-MCNC: 100 MG/DL
NONHDLC SERPL-MCNC: 117 MG/DL
TRIGL SERPL-MCNC: 81 MG/DL
VIT B12 SERPL-MCNC: 895 PG/ML

## 2023-01-26 PROCEDURE — 99214 OFFICE O/P EST MOD 30 MIN: CPT | Mod: 25

## 2023-01-26 PROCEDURE — 93000 ELECTROCARDIOGRAM COMPLETE: CPT

## 2023-01-26 NOTE — REVIEW OF SYSTEMS
[Dyspnea on exertion] : dyspnea during exertion [Heartburn] : heartburn [Joint Pain] : joint pain [Negative] : Heme/Lymph

## 2023-02-08 ENCOUNTER — APPOINTMENT (OUTPATIENT)
Dept: CARDIOLOGY | Facility: CLINIC | Age: 73
End: 2023-02-08
Payer: MEDICARE

## 2023-02-08 PROCEDURE — 78452 HT MUSCLE IMAGE SPECT MULT: CPT

## 2023-02-08 PROCEDURE — 93306 TTE W/DOPPLER COMPLETE: CPT

## 2023-02-08 PROCEDURE — A9500: CPT

## 2023-02-08 PROCEDURE — 93015 CV STRESS TEST SUPVJ I&R: CPT

## 2023-04-10 NOTE — PHYSICAL EXAM
[Normal Appearance] : normal appearance [General Appearance - Well Developed] : well developed [Well Groomed] : well groomed [General Appearance - Well Nourished] : well nourished [No Deformities] : no deformities [General Appearance - In No Acute Distress] : no acute distress [Normal Conjunctiva] : the conjunctiva exhibited no abnormalities [Normal Jugular Venous A Waves Present] : normal jugular venous A waves present [Normal Jugular Venous V Waves Present] : normal jugular venous V waves present [No Jugular Venous Velasquez A Waves] : no jugular venous velasquez A waves [Respiration, Rhythm And Depth] : normal respiratory rhythm and effort [Exaggerated Use Of Accessory Muscles For Inspiration] : no accessory muscle use [Auscultation Breath Sounds / Voice Sounds] : lungs were clear to auscultation bilaterally [Bowel Sounds] : normal bowel sounds [Abdomen Soft] : soft [Abdomen Tenderness] : non-tender [Nail Clubbing] : no clubbing of the fingernails [Cyanosis, Localized] : no localized cyanosis [Petechial Hemorrhages (___cm)] : no petechial hemorrhages [Skin Color & Pigmentation] : normal skin color and pigmentation [] : no rash [No Skin Ulcers] : no skin ulcer [Oriented To Time, Place, And Person] : oriented to person, place, and time [Affect] : the affect was normal [Mood] : the mood was normal [No Anxiety] : not feeling anxious [Normal Rate] : normal [Rhythm Regular] : regular [Normal S1] : normal S1 [Normal S2] : normal S2 [No Murmur] : no murmurs heard [FreeTextEntry1] : She was wearing a face mask during the examination. [S3] : no S3 [Right Carotid Bruit] : no bruit heard over the right carotid [Left Carotid Bruit] : no bruit heard over the left carotid [Bruit] : no bruit heard

## 2023-04-10 NOTE — HISTORY OF PRESENT ILLNESS
[FreeTextEntry1] : Patient is a 72-year-old woman with a history of hypothyroidism, hyperlipidemia, COVID infection summer of 2020, and family history of coronary artery disease who presents today for follow up of hyperlipidemia and HTN. She states that her major issue at this time is her joint pain. She experiences dyspnea with exertion which does not appear to affect her activities of daily living.  She otherwise denies any chest pain, dyspnea at rest, palpitations, dizziness, and headaches. She states that her ENT feels that her dyspnea is from her deviated septum. She also experiences episodes of heartburn.

## 2023-04-10 NOTE — DISCUSSION/SUMMARY
[EKG obtained to assist in diagnosis and management of assessed problem(s)] : EKG obtained to assist in diagnosis and management of assessed problem(s) [FreeTextEntry1] : IMPRESSION: Mrs. Lieberman is a 72-year-old woman with a history of hypothyroidism, hyperlipidemia, COVID infection summer of 2020, and family history of coronary artery disease who presents today for follow up of hyperlipidemia and HTN.\par \par PLAN:\par 1. She will continue on Vytorin 10-20mg daily in addition to diet modification. She had a cardiac catheterization about 4 years ago that that revealed minimal atherosclerosis. We should aim for a goal LDL of at least less than 100, if not close to 70.  She states that she recently had blood work and will be getting me those results.\par 2.  I have asked her to schedule an echocardiogram and a nuclear stress test given her dyspnea with exertion. Her ECG was normal today.\par 3. Her blood pressure is OK, thus she will continue on diet modification.\par 4. She will follow up with me in a year or sooner should there be any abnormalities on her cardiac tests.

## 2024-01-08 ENCOUNTER — APPOINTMENT (OUTPATIENT)
Dept: PULMONOLOGY | Facility: CLINIC | Age: 74
End: 2024-01-08
Payer: MEDICARE

## 2024-01-08 ENCOUNTER — APPOINTMENT (OUTPATIENT)
Dept: PULMONOLOGY | Facility: CLINIC | Age: 74
End: 2024-01-08

## 2024-01-08 VITALS
HEIGHT: 63 IN | RESPIRATION RATE: 16 BRPM | SYSTOLIC BLOOD PRESSURE: 131 MMHG | WEIGHT: 190 LBS | DIASTOLIC BLOOD PRESSURE: 84 MMHG | BODY MASS INDEX: 33.66 KG/M2 | OXYGEN SATURATION: 96 % | HEART RATE: 82 BPM

## 2024-01-08 DIAGNOSIS — U07.1 COVID-19: ICD-10-CM

## 2024-01-08 PROCEDURE — 99214 OFFICE O/P EST MOD 30 MIN: CPT

## 2024-01-08 RX ORDER — MELOXICAM 7.5 MG/1
7.5 TABLET ORAL
Qty: 60 | Refills: 0 | Status: COMPLETED | COMMUNITY
Start: 2020-01-02 | End: 2024-01-08

## 2024-01-11 VITALS
SYSTOLIC BLOOD PRESSURE: 131 MMHG | HEIGHT: 63 IN | BODY MASS INDEX: 51.91 KG/M2 | DIASTOLIC BLOOD PRESSURE: 84 MMHG | WEIGHT: 293 LBS | OXYGEN SATURATION: 96 % | TEMPERATURE: 97.5 F | RESPIRATION RATE: 16 BRPM | HEART RATE: 82 BPM

## 2024-01-11 NOTE — CONSULT LETTER
[Dear  ___] : Dear  [unfilled], [Courtesy Letter:] : I had the pleasure of seeing your patient, [unfilled], in my office today. [Please see my note below.] : Please see my note below. [Consult Closing:] : Thank you very much for allowing me to participate in the care of this patient.  If you have any questions, please do not hesitate to contact me. [Sincerely,] : Sincerely, [FreeTextEntry3] : SURYA FLEMING MD  [address], [address] [place] [address]

## 2024-01-11 NOTE — HISTORY OF PRESENT ILLNESS
[Never] : never [TextBox_4] : 73-year-old female presents for pulmonary evaluation post COVID-19 infection.  Patient was febrile, complaining of fatigue and throat discomfort 2 weeks ago, treated with Paxlovid.  She denied any respiratory complaints however at the time.  Presently she feels "better" without fever, cough, chest pain, shortness of breath or hemoptysis.  Patient was seen in the past by me, evaluated for possible sleep apnea, at which time a sleep study was recommended, which was not performed.  She continues to snore with frequent nocturnal arousals.  She complains of occasional daytime somnolence and fatigue. [Awakes Unrefreshed] : awakes unrefreshed [Daytime Somnolence] : daytime somnolence [Fatigue] : fatigue [Frequent Nocturnal Awakening] : frequent nocturnal awakening [Snoring] : snoring

## 2024-01-11 NOTE — DISCUSSION/SUMMARY
[FreeTextEntry1] : 73-year-old female post COVID-19 infection 2 weeks ago with stable pulmonary exam.  Evaluation for possible sleep apnea was again discussed at great length with the patient and her  who was present.  A home sleep study will be arranged.  She is to avoid sedative hypnotic meds.  Diet and weight loss were also discussed.  Follow-up with her PMD as before was recommended.

## 2024-01-18 ENCOUNTER — NON-APPOINTMENT (OUTPATIENT)
Age: 74
End: 2024-01-18

## 2024-01-18 ENCOUNTER — APPOINTMENT (OUTPATIENT)
Dept: CARDIOLOGY | Facility: CLINIC | Age: 74
End: 2024-01-18
Payer: MEDICARE

## 2024-01-18 VITALS
OXYGEN SATURATION: 96 % | DIASTOLIC BLOOD PRESSURE: 83 MMHG | WEIGHT: 190 LBS | BODY MASS INDEX: 33.66 KG/M2 | HEART RATE: 73 BPM | SYSTOLIC BLOOD PRESSURE: 122 MMHG | RESPIRATION RATE: 15 BRPM | HEIGHT: 63 IN

## 2024-01-18 PROCEDURE — 93000 ELECTROCARDIOGRAM COMPLETE: CPT

## 2024-01-18 PROCEDURE — 99213 OFFICE O/P EST LOW 20 MIN: CPT | Mod: 25

## 2024-01-19 ENCOUNTER — APPOINTMENT (OUTPATIENT)
Dept: OTOLARYNGOLOGY | Facility: CLINIC | Age: 74
End: 2024-01-19
Payer: MEDICARE

## 2024-01-19 VITALS
SYSTOLIC BLOOD PRESSURE: 122 MMHG | BODY MASS INDEX: 33.37 KG/M2 | HEIGHT: 63 IN | WEIGHT: 188.31 LBS | DIASTOLIC BLOOD PRESSURE: 81 MMHG | HEART RATE: 74 BPM

## 2024-01-19 DIAGNOSIS — M48.02 SPINAL STENOSIS, CERVICAL REGION: ICD-10-CM

## 2024-01-19 DIAGNOSIS — H60.63 UNSPECIFIED CHRONIC OTITIS EXTERNA, BILATERAL: ICD-10-CM

## 2024-01-19 DIAGNOSIS — E11.9 TYPE 2 DIABETES MELLITUS W/OUT COMPLICATIONS: ICD-10-CM

## 2024-01-19 DIAGNOSIS — R05.3 CHRONIC COUGH: ICD-10-CM

## 2024-01-19 DIAGNOSIS — H91.8X3 OTHER SPECIFIED HEARING LOSS, BILATERAL: ICD-10-CM

## 2024-01-19 DIAGNOSIS — J34.2 DEVIATED NASAL SEPTUM: ICD-10-CM

## 2024-01-19 DIAGNOSIS — M26.622 ARTHRALGIA OF LEFT TEMPOROMANDIBULAR JOINT: ICD-10-CM

## 2024-01-19 DIAGNOSIS — H69.93 UNSPECIFIED EUSTACHIAN TUBE DISORDER, BILATERAL: ICD-10-CM

## 2024-01-19 PROCEDURE — 92567 TYMPANOMETRY: CPT

## 2024-01-19 PROCEDURE — 99214 OFFICE O/P EST MOD 30 MIN: CPT

## 2024-01-19 PROCEDURE — 92557 COMPREHENSIVE HEARING TEST: CPT

## 2024-01-19 RX ORDER — AZELASTINE HYDROCHLORIDE 137 UG/1
137 SPRAY, METERED NASAL TWICE DAILY
Qty: 3 | Refills: 3 | Status: ACTIVE | COMMUNITY
Start: 2024-01-19 | End: 1900-01-01

## 2024-01-19 RX ORDER — FLUTICASONE PROPIONATE 50 UG/1
50 SPRAY, METERED NASAL
Qty: 48 | Refills: 1 | Status: ACTIVE | COMMUNITY
Start: 2024-01-19 | End: 1900-01-01

## 2024-01-19 NOTE — HISTORY OF PRESENT ILLNESS
[de-identified] : Pt with h/o of clogged ears, TMJ, and reflux presents today stating that she has discomfort in her left ear. Pt states that after she goes in the water it still feels like there is water in her ear. Pt states that the muscles in her neck get very tense and sometimes she cannot move because of it. Pt denies hearing loss.

## 2024-01-19 NOTE — PHYSICAL EXAM
[Wade Test Lateralizes To Right] : tone lateralization to the right [] : septum deviated to the left [Midline] : trachea located in midline position [Removed] : palatine tonsils previously removed [Normal] : orientation to person, place, and time: normal [de-identified] : Submandibular glands firm. SCM and trapezius tight on the left. Mild TMJ on the left. [Hearing Loss Right Only] : normal [Hearing Loss Left Only] : normal [FreeTextEntry8] : minimal cerumen removed via curettage [FreeTextEntry9] : minimal cerumen removed via curettage [de-identified] :  mildly inflamed turbinates [de-identified] : uvula slightly edentulous [FreeTextEntry2] :  sinuses nontender to percussion.  sensations intact

## 2024-01-19 NOTE — CONSULT LETTER
[Dear  ___] : Dear  [unfilled], [Courtesy Letter:] : I had the pleasure of seeing your patient, [unfilled], in my office today. [Please see my note below.] : Please see my note below. [Consult Closing:] : Thank you very much for allowing me to participate in the care of this patient.  If you have any questions, please do not hesitate to contact me. [Sincerely,] : Sincerely, [FreeTextEntry3] :  Maury Shanks MD FACS

## 2024-01-19 NOTE — ASSESSMENT
[FreeTextEntry1] :  Reviewed and reconciled medications, allergies, PMHx, PSHx, SocHx, FMHx.  Pt with h/o of clogged ears, TMJ, and reflux presents today stating that she has discomfort in her left ear. Pt states that after she goes in the water it still feels like there is water in her ear. Pt states that the muscles in her neck get very tense and sometimes she cannot move because of it. Pt denies hearing loss.  US of neck 1/27/23: -small lymph nodes throughout the neck  CT neck with contrast 2/16/23: -normal -fusion in her spine  Physical exam: -right ear: minimal cerumen removed via curettage -left ear: minimal cerumen removed via curettage -Wade's lateralization to the right -Submandibular glands firm -SCM and trapezius tight on the left -Mild TMJ on the left -deviated septum left -mildly inflamed turbinates -tonsils removed -uvula slightly edentulous  Audio: -ETF abnormal AU -WNL borderline 250-8000 Hz with mild SNHL dip at 4000 Hz and mild dip at 8000 Hz -100%understanding at 55 dB bilaterally  Plan:  Audio - results interpreted by Dr. Shanks and reviewed with the patient. -Start Flonase - 2 sprays bilaterally QD, spray laterally. -Start Atrovent - 1 or 2 sprays bilaterally up to QID, spray laterally -follow TMJ sheet -FU in 1 year

## 2024-02-07 ENCOUNTER — APPOINTMENT (OUTPATIENT)
Dept: ORTHOPEDIC SURGERY | Facility: CLINIC | Age: 74
End: 2024-02-07
Payer: MEDICARE

## 2024-02-07 DIAGNOSIS — M19.012 PRIMARY OSTEOARTHRITIS, RIGHT SHOULDER: ICD-10-CM

## 2024-02-07 DIAGNOSIS — M17.11 UNILATERAL PRIMARY OSTEOARTHRITIS, RIGHT KNEE: ICD-10-CM

## 2024-02-07 DIAGNOSIS — M19.011 PRIMARY OSTEOARTHRITIS, RIGHT SHOULDER: ICD-10-CM

## 2024-02-07 PROCEDURE — 99214 OFFICE O/P EST MOD 30 MIN: CPT | Mod: 25

## 2024-02-07 PROCEDURE — 20610 DRAIN/INJ JOINT/BURSA W/O US: CPT | Mod: 59,RT

## 2024-02-07 PROCEDURE — 20611 DRAIN/INJ JOINT/BURSA W/US: CPT | Mod: 50

## 2024-02-07 PROCEDURE — 73030 X-RAY EXAM OF SHOULDER: CPT | Mod: 50

## 2024-02-07 PROCEDURE — 73564 X-RAY EXAM KNEE 4 OR MORE: CPT | Mod: RT

## 2024-03-14 ENCOUNTER — NON-APPOINTMENT (OUTPATIENT)
Age: 74
End: 2024-03-14

## 2024-05-27 NOTE — CARDIOLOGY SUMMARY
[Normal] : normal [___] : [unfilled] [LVEF ___%] : LVEF [unfilled]% [None] : no pulmonary hypertension [Mild] : mild mitral regurgitation [de-identified] : 1/17/2024: Sinus Rhythm at 70 beats per minute.  [de-identified] : Exercise Nuclear Stress Test performed on 2023: Medium-sized, mild defect(s) in the anterior wall that is predominantly fixed with normal wall motion, suggestive of breast attenuation artifact. LVEF= 70%, revealing overall preserved left ventricular ejection fraction. Rare VPD's occurred during recovery. Blood pressure response: hypertensive. Stress EC.0 mm upsloping ST segment depression in leads II, III and aVF, V5 and V6 starting at 2:50 minutes during stress at 123 bpm and persisting 1:00 minutes into recovery. The heart rate response was exaggerated. Fair exercise capacity (5 METS).  [de-identified] : 2/8/2023: Endocardium not well visualized; grossly normal left ventricular systolic function. EF is approximately 65%. Mild concentric left ventricular hypertrophy. Mild diastolic dysfunction. Normal right ventricular size with normal right ventricular systolic function. The left atrium is moderately dilated. Mild mitral regurgitation. Mitral annular calcification and thickened mitral valve leaflets with mildly decreased opening. Mild mitral stenosis. Calcified trileaflet aortic valve with normal opening. Trace aortic regurgitation. No echocardiographic evidence of pulmonary hypertension. PASP= 34 mmHg. Mild tricuspid regurgitation.

## 2024-05-27 NOTE — DISCUSSION/SUMMARY
[FreeTextEntry1] : IMPRESSION: Mrs. Lieberman is a 73-year-old woman with a history of hypothyroidism, hyperlipidemia, COVID infection summer of 2020, and family history of coronary artery disease who presents today for follow up of hyperlipidemia and HTN.  PLAN: 1. She will continue on Vytorin 10-40mg daily in addition to diet modification. She had a cardiac catheterization about 5 years ago that that revealed minimal atherosclerosis. We should aim for a goal LDL of at least less than 100, if not close to 70.  She states that she recently had blood work and will be getting me those results. Her ECG was normal today. She had a nuclear stress test a year ago that did not reveal any ischemia.  2. Her blood pressure is OK, thus she will continue on diet modification. 3. She will follow up with me in a year or sooner should she experience any symptoms in the interim.  [EKG obtained to assist in diagnosis and management of assessed problem(s)] : EKG obtained to assist in diagnosis and management of assessed problem(s)

## 2024-05-27 NOTE — HISTORY OF PRESENT ILLNESS
[FreeTextEntry1] : Patient is a 73-year-old woman with a history of hypothyroidism, hyperlipidemia, COVID infection summer of 2020, and family history of coronary artery disease who presents today for follow up of hyperlipidemia and HTN. She states that she had COVID last month and feels that it was worse than in 2020. She also mentions experiencing shoulder pain. She experiences dyspnea with exertion which does not appear to affect her activities of daily living.  She otherwise denies any chest pain, dyspnea at rest, palpitations, dizziness, and headaches.

## 2024-05-27 NOTE — PHYSICAL EXAM
[General Appearance - Well Developed] : well developed [Normal Appearance] : normal appearance [Well Groomed] : well groomed [General Appearance - Well Nourished] : well nourished [No Deformities] : no deformities [General Appearance - In No Acute Distress] : no acute distress [Normal Conjunctiva] : the conjunctiva exhibited no abnormalities [Normal Jugular Venous A Waves Present] : normal jugular venous A waves present [Normal Jugular Venous V Waves Present] : normal jugular venous V waves present [No Jugular Venous Velasquez A Waves] : no jugular venous velasquez A waves [Respiration, Rhythm And Depth] : normal respiratory rhythm and effort [Exaggerated Use Of Accessory Muscles For Inspiration] : no accessory muscle use [Auscultation Breath Sounds / Voice Sounds] : lungs were clear to auscultation bilaterally [Bowel Sounds] : normal bowel sounds [Abdomen Soft] : soft [Abdomen Tenderness] : non-tender [Nail Clubbing] : no clubbing of the fingernails [Cyanosis, Localized] : no localized cyanosis [Petechial Hemorrhages (___cm)] : no petechial hemorrhages [Skin Color & Pigmentation] : normal skin color and pigmentation [] : no rash [No Skin Ulcers] : no skin ulcer [Oriented To Time, Place, And Person] : oriented to person, place, and time [Affect] : the affect was normal [Mood] : the mood was normal [No Anxiety] : not feeling anxious [Normal Rate] : normal [Rhythm Regular] : regular [Normal S1] : normal S1 [Normal S2] : normal S2 [No Murmur] : no murmurs heard [FreeTextEntry1] : Extraocular muscles intact. Anicteric sclerae. [Normal Oral Mucosa] : normal oral mucosa [No Oral Pallor] : no oral pallor [No Oral Cyanosis] : no oral cyanosis [S3] : no S3 [Right Carotid Bruit] : no bruit heard over the right carotid [Left Carotid Bruit] : no bruit heard over the left carotid [Bruit] : no bruit heard

## 2024-06-11 NOTE — ED PROVIDER NOTE - NS_EDPROVIDERDISPOUSERTYPE_ED_A_ED
PATIENT VISIT FAMILY MEDICINE    CC:   Chief Complaint   Patient presents with    Follow-up     Med f/u       HPI: Carly Carmen  is a 61 y.o. female:    Patient is known to me.  Patient presents routine follow up on chronic conditions    Has not noted much improvement on adipex. She is active and has cut back on snacks but no regular exercise regimen.         PMHX:   Past Medical History:   Diagnosis Date    Abnormal Pap smear of cervix years ago    colpo done    Allergy     Chronic hip pain     Hidradenitis suppurativa 2016       PSHX:   Past Surgical History:   Procedure Laterality Date    BREAST BIOPSY Left     BENIGN    COLONOSCOPY N/A 2016    Procedure: COLONOSCOPY;  Surgeon: Luis Santana Jr., MD;  Location: [facility];  Service: Endoscopy;  Laterality: N/A;    CYST REMOVAL      subcutaneous cyst to right chest wall    DILATION AND CURETTAGE OF UTERUS         FAMHX:   Family History   Problem Relation Name Age of Onset    Cancer Mother  69        Lung Cancer with Brain Mets -  72    Heart disease Father          defibrillator    No Known Problems Brother      No Known Problems Brother      Breast cancer Neg Hx      Colon cancer Neg Hx      Ovarian cancer Neg Hx         SOCHX:   Social History     Socioeconomic History    Marital status:     Number of children: 1   Tobacco Use    Smoking status: Former     Current packs/day: 0.50     Average packs/day: 0.5 packs/day for 30.0 years (15.0 ttl pk-yrs)     Types: Cigarettes, Vaping with nicotine     Passive exposure: Current    Smokeless tobacco: Never   Substance and Sexual Activity    Alcohol use: Yes     Alcohol/week: 8.0 standard drinks of alcohol     Types: 8 Glasses of wine per week     Comment: weekends    Drug use: Yes     Types: Amphetamines    Sexual activity: Yes     Partners: Male     Birth control/protection: Post-menopausal     Comment:    Social History Narrative    She is from [place].  She works for [employer]  [employer] [facility] [employer] [facility] [employer] as an .   She is twice .  Her first   in .  She remarried in .  She has a 25 year-old daughter from her first marriage, who is getting  in 2014.  [place] with her  and her 18-year-old step son.         Social Determinants of Health     Financial Resource Strain: Low Risk  (2024)    Overall Financial Resource Strain (CARDIA)     Difficulty of Paying Living Expenses: Not hard at all   Food Insecurity: No Food Insecurity (2024)    Hunger Vital Sign     Worried About Running Out of Food in the Last Year: Never true     Ran Out of Food in the Last Year: Never true   Transportation Needs: No Transportation Needs (2024)    PRAPARE - Transportation     Lack of Transportation (Medical): No     Lack of Transportation (Non-Medical): No   Physical Activity: Insufficiently Active (2024)    Exercise Vital Sign     Days of Exercise per Week: 1 day     Minutes of Exercise per Session: 20 min   Stress: No Stress Concern Present (2024)    [employer] - Occupational Stress Questionnaire     Feeling of Stress : Not at all   Housing Stability: Low Risk  (2024)    Housing Stability Vital Sign     Unable to Pay for Housing in the Last Year: No     Number of Places Lived in the Last Year: 1     Unstable Housing in the Last Year: No       ALLERGIES: Review of patient's allergies indicates:  No Known Allergies    MEDS:   Current Outpatient Medications:     adalimumab 40 mg/0.4 mL PnKt, Inject 0.4 mLs (40 mg total) into the skin every 7 days., Disp: 4 Pen, Rfl: 6    atorvastatin (LIPITOR) 20 MG tablet, TAKE 1 TABLET DAILY, Disp: 90 tablet, Rfl: 3    DULoxetine (CYMBALTA) 30 MG capsule, Take 1 capsule (30 mg total) by mouth once daily., Disp: 90 capsule, Rfl: 3    estrogens,esterified,-methyltestosterone 0.625-1.25mg (ESTRATEST HS) per tablet, TAKE 1 TABLET  "DAILY, Disp: 90 tablet, Rfl: 3    indomethacin (INDOCIN) 50 MG capsule, Take 1 capsule (50 mg total) by mouth daily as needed (pain)., Disp: 90 capsule, Rfl: 1    medroxyPROGESTERone (PROVERA) 2.5 MG tablet, Take 2.5 mg by mouth once daily., Disp: , Rfl:     pantoprazole (PROTONIX) 40 MG tablet, Take 1 tablet (40 mg total) by mouth once daily., Disp: 90 tablet, Rfl: 3    tiZANidine (ZANAFLEX) 2 MG tablet, Take 2 mg by mouth 2 (two) times daily., Disp: , Rfl:     phentermine (ADIPEX-P) 37.5 mg tablet, Take 1 tablet (37.5 mg total) by mouth before breakfast., Disp: 30 tablet, Rfl: 0    OBJECTIVE:   Vitals:    06/11/24 0826   BP: 138/78   BP Location: Left arm   Patient Position: Sitting   BP Method: Large (Manual)   Pulse: 84   SpO2: 96%   Weight: 83.2 kg (183 lb 6.8 oz)   Height: 5' 8" (1.727 m)     Body mass index is 27.89 kg/m².      Physical Exam  Vitals and nursing note reviewed.   Constitutional:       Appearance: Normal appearance.   HENT:      Head: Normocephalic and atraumatic.   Eyes:      General: No scleral icterus.     Extraocular Movements: Extraocular movements intact.   Pulmonary:      Effort: Pulmonary effort is normal.   Neurological:      Mental Status: She is alert.           LABS:   A1C:  Recent Labs   Lab 05/19/23  1650   Hemoglobin A1C 5.6     CBC:  Recent Labs   Lab 01/13/24  1016   WBC 4.60   RBC 4.96   Hemoglobin 15.3   Hematocrit 43.7   Platelets 308   MCV 88   MCH 30.8   MCHC 35.0     CMP:  Recent Labs   Lab 01/13/24  1016   Glucose 111 H   Calcium 9.4   Albumin 4.4   Total Protein 7.6   Sodium 143   Potassium 3.9   CO2 26   Chloride 108   BUN 11   Creatinine 0.70   Alkaline Phosphatase 76   ALT 27   AST 31   Total Bilirubin 0.8     LIPIDS:  Recent Labs   Lab 08/23/23  1035   TSH 0.622   HDL 40   Cholesterol 127   Triglycerides 85   LDL Cholesterol 70.0   HDL/Cholesterol Ratio 31.5   Non-HDL Cholesterol 87   Total Cholesterol/HDL Ratio 3.2     TSH:  Recent Labs   Lab 08/23/23  1035   TSH " 0.622         ASSESSMENT & PLAN:    Problem List Items Addressed This Visit          Cardiac/Vascular    Aortic atherosclerosis (Chronic)    Overview     Seen on 03/13/2023 CT chest.  Known hyperlipidemia.  On atorvastatin.         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  JUSTINO, RF, CCP negative but with polyarthralgias and reportedly clinically consistent with RA.  Continue adalimumab. Patient at greater risk for opportunistic infections            Endocrine    BMI 27.0-27.9,adult (Chronic)    Overview     Body mass index is 27.89 kg/m².  The patient is asked to make an attempt to improve diet and exercise patterns to aid in medical management of this problem.  Okay to increase adipex. Max tx 12 weeks. Aware of risks.          Other Visit Diagnoses       BMI 28.0-28.9,adult                  Follow up in about 3 months (around 9/11/2024).      RTC/ED precautions discussed where applicable.   Encouraged patient to let me know if there are any further questions/concerns.     Advise patient/caretaker to check with insurance regarding orders to avoid unexpected fees/costs.     The patient/caretaker indicates understanding of these issues and agrees with the plan.    Dr. Allen Rao MD  Family Medicine   Scribe Attestation (For Scribes USE Only)... Scribe Attestation (For Scribes USE Only).../Attending Attestation (For Attendings USE Only)...

## 2024-08-20 NOTE — ED PROVIDER NOTE - PSH
H/O spinal fusion    H/O unilateral oophorectomy    History of tonsillectomy    S/P bilateral breast reduction
Yes

## 2025-01-06 ENCOUNTER — NON-APPOINTMENT (OUTPATIENT)
Age: 75
End: 2025-01-06

## 2025-01-06 ENCOUNTER — APPOINTMENT (OUTPATIENT)
Dept: CARDIOLOGY | Facility: CLINIC | Age: 75
End: 2025-01-06
Payer: MEDICARE

## 2025-01-06 VITALS
SYSTOLIC BLOOD PRESSURE: 120 MMHG | DIASTOLIC BLOOD PRESSURE: 72 MMHG | RESPIRATION RATE: 12 BRPM | OXYGEN SATURATION: 97 % | HEIGHT: 63 IN | WEIGHT: 182 LBS | HEART RATE: 67 BPM | BODY MASS INDEX: 32.25 KG/M2

## 2025-01-06 DIAGNOSIS — R00.2 PALPITATIONS: ICD-10-CM

## 2025-01-06 PROCEDURE — G2211 COMPLEX E/M VISIT ADD ON: CPT

## 2025-01-06 PROCEDURE — 99213 OFFICE O/P EST LOW 20 MIN: CPT

## 2025-01-06 PROCEDURE — 93000 ELECTROCARDIOGRAM COMPLETE: CPT

## 2025-01-24 ENCOUNTER — APPOINTMENT (OUTPATIENT)
Dept: ORTHOPEDIC SURGERY | Facility: CLINIC | Age: 75
End: 2025-01-24
Payer: MEDICARE

## 2025-01-24 VITALS — WEIGHT: 182 LBS | HEIGHT: 63 IN | BODY MASS INDEX: 32.25 KG/M2

## 2025-01-24 DIAGNOSIS — M19.011 PRIMARY OSTEOARTHRITIS, RIGHT SHOULDER: ICD-10-CM

## 2025-01-24 DIAGNOSIS — M19.012 PRIMARY OSTEOARTHRITIS, RIGHT SHOULDER: ICD-10-CM

## 2025-01-24 DIAGNOSIS — M17.11 UNILATERAL PRIMARY OSTEOARTHRITIS, RIGHT KNEE: ICD-10-CM

## 2025-01-24 PROCEDURE — 99214 OFFICE O/P EST MOD 30 MIN: CPT | Mod: 25

## 2025-01-24 PROCEDURE — 20610 DRAIN/INJ JOINT/BURSA W/O US: CPT | Mod: RT

## 2025-01-24 PROCEDURE — 73030 X-RAY EXAM OF SHOULDER: CPT | Mod: LT

## 2025-01-31 ENCOUNTER — APPOINTMENT (OUTPATIENT)
Dept: CARDIOLOGY | Facility: CLINIC | Age: 75
End: 2025-01-31

## 2025-01-31 PROCEDURE — 93306 TTE W/DOPPLER COMPLETE: CPT

## 2025-03-21 ENCOUNTER — INPATIENT (INPATIENT)
Facility: HOSPITAL | Age: 75
LOS: 0 days | Discharge: HOME | DRG: 379 | End: 2025-03-22
Attending: INTERNAL MEDICINE | Admitting: INTERNAL MEDICINE
Payer: COMMERCIAL

## 2025-03-21 ENCOUNTER — RESULT REVIEW (OUTPATIENT)
Age: 75
End: 2025-03-21

## 2025-03-21 VITALS
TEMPERATURE: 98 F | OXYGEN SATURATION: 98 % | DIASTOLIC BLOOD PRESSURE: 80 MMHG | WEIGHT: 179.9 LBS | RESPIRATION RATE: 18 BRPM | HEIGHT: 63 IN | SYSTOLIC BLOOD PRESSURE: 138 MMHG | HEART RATE: 100 BPM

## 2025-03-21 DIAGNOSIS — Z90.721 ACQUIRED ABSENCE OF OVARIES, UNILATERAL: Chronic | ICD-10-CM

## 2025-03-21 DIAGNOSIS — Z98.890 OTHER SPECIFIED POSTPROCEDURAL STATES: Chronic | ICD-10-CM

## 2025-03-21 DIAGNOSIS — Z98.1 ARTHRODESIS STATUS: Chronic | ICD-10-CM

## 2025-03-21 DIAGNOSIS — Z90.89 ACQUIRED ABSENCE OF OTHER ORGANS: Chronic | ICD-10-CM

## 2025-03-21 DIAGNOSIS — K92.2 GASTROINTESTINAL HEMORRHAGE, UNSPECIFIED: ICD-10-CM

## 2025-03-21 LAB
ABO RH CONFIRMATION: SIGNIFICANT CHANGE UP
ALBUMIN SERPL ELPH-MCNC: 3.2 G/DL — LOW (ref 3.3–5)
ALP SERPL-CCNC: 62 U/L — SIGNIFICANT CHANGE UP (ref 30–120)
ALT FLD-CCNC: 25 U/L — SIGNIFICANT CHANGE UP (ref 10–60)
ANION GAP SERPL CALC-SCNC: 8 MMOL/L — SIGNIFICANT CHANGE UP (ref 5–17)
APTT BLD: 26.9 SEC — SIGNIFICANT CHANGE UP (ref 24.5–35.6)
AST SERPL-CCNC: 17 U/L — SIGNIFICANT CHANGE UP (ref 10–40)
BASOPHILS # BLD AUTO: 0.02 K/UL — SIGNIFICANT CHANGE UP (ref 0–0.2)
BASOPHILS NFR BLD AUTO: 0.3 % — SIGNIFICANT CHANGE UP (ref 0–2)
BILIRUB SERPL-MCNC: 0.7 MG/DL — SIGNIFICANT CHANGE UP (ref 0.2–1.2)
BUN SERPL-MCNC: 12 MG/DL — SIGNIFICANT CHANGE UP (ref 7–23)
CALCIUM SERPL-MCNC: 8.1 MG/DL — LOW (ref 8.4–10.5)
CHLORIDE SERPL-SCNC: 104 MMOL/L — SIGNIFICANT CHANGE UP (ref 96–108)
CK SERPL-CCNC: 88 U/L — SIGNIFICANT CHANGE UP (ref 26–192)
CO2 SERPL-SCNC: 28 MMOL/L — SIGNIFICANT CHANGE UP (ref 22–31)
CREAT SERPL-MCNC: 0.71 MG/DL — SIGNIFICANT CHANGE UP (ref 0.5–1.3)
EGFR: 89 ML/MIN/1.73M2 — SIGNIFICANT CHANGE UP
EGFR: 89 ML/MIN/1.73M2 — SIGNIFICANT CHANGE UP
EOSINOPHIL # BLD AUTO: 0.09 K/UL — SIGNIFICANT CHANGE UP (ref 0–0.5)
EOSINOPHIL NFR BLD AUTO: 1.2 % — SIGNIFICANT CHANGE UP (ref 0–6)
GLUCOSE SERPL-MCNC: 147 MG/DL — HIGH (ref 70–99)
HCT VFR BLD CALC: 17 % — CRITICAL LOW (ref 34.5–45)
HGB BLD-MCNC: 5.2 G/DL — CRITICAL LOW (ref 11.5–15.5)
IMM GRANULOCYTES NFR BLD AUTO: 0.9 % — SIGNIFICANT CHANGE UP (ref 0–0.9)
INR BLD: 1.12 RATIO — SIGNIFICANT CHANGE UP (ref 0.85–1.16)
LYMPHOCYTES # BLD AUTO: 2.12 K/UL — SIGNIFICANT CHANGE UP (ref 1–3.3)
LYMPHOCYTES # BLD AUTO: 27.2 % — SIGNIFICANT CHANGE UP (ref 13–44)
MCHC RBC-ENTMCNC: 28.9 PG — SIGNIFICANT CHANGE UP (ref 27–34)
MCHC RBC-ENTMCNC: 30.6 G/DL — LOW (ref 32–36)
MCV RBC AUTO: 94.4 FL — SIGNIFICANT CHANGE UP (ref 80–100)
MONOCYTES # BLD AUTO: 0.72 K/UL — SIGNIFICANT CHANGE UP (ref 0–0.9)
MONOCYTES NFR BLD AUTO: 9.2 % — SIGNIFICANT CHANGE UP (ref 2–14)
NEUTROPHILS # BLD AUTO: 4.78 K/UL — SIGNIFICANT CHANGE UP (ref 1.8–7.4)
NEUTROPHILS NFR BLD AUTO: 61.2 % — SIGNIFICANT CHANGE UP (ref 43–77)
NRBC BLD AUTO-RTO: 0 /100 WBCS — SIGNIFICANT CHANGE UP (ref 0–0)
OB PNL STL: POSITIVE
PLATELET # BLD AUTO: 345 K/UL — SIGNIFICANT CHANGE UP (ref 150–400)
POTASSIUM SERPL-MCNC: 3.4 MMOL/L — LOW (ref 3.5–5.3)
POTASSIUM SERPL-SCNC: 3.4 MMOL/L — LOW (ref 3.5–5.3)
PROT SERPL-MCNC: 6.1 G/DL — SIGNIFICANT CHANGE UP (ref 6–8.3)
PROTHROM AB SERPL-ACNC: 13.2 SEC — SIGNIFICANT CHANGE UP (ref 9.9–13.4)
RBC # BLD: 1.8 M/UL — LOW (ref 3.8–5.2)
RBC # FLD: 17 % — HIGH (ref 10.3–14.5)
SODIUM SERPL-SCNC: 140 MMOL/L — SIGNIFICANT CHANGE UP (ref 135–145)
TROPONIN I, HIGH SENSITIVITY RESULT: 80.8 NG/L — HIGH
WBC # BLD: 7.8 K/UL — SIGNIFICANT CHANGE UP (ref 3.8–10.5)
WBC # FLD AUTO: 7.8 K/UL — SIGNIFICANT CHANGE UP (ref 3.8–10.5)

## 2025-03-21 PROCEDURE — 99285 EMERGENCY DEPT VISIT HI MDM: CPT | Mod: FS

## 2025-03-21 PROCEDURE — 71045 X-RAY EXAM CHEST 1 VIEW: CPT | Mod: 26

## 2025-03-21 PROCEDURE — 93010 ELECTROCARDIOGRAM REPORT: CPT

## 2025-03-21 PROCEDURE — 74177 CT ABD & PELVIS W/CONTRAST: CPT | Mod: 26

## 2025-03-21 PROCEDURE — 93306 TTE W/DOPPLER COMPLETE: CPT | Mod: 26

## 2025-03-21 RX ORDER — ACETAMINOPHEN 500 MG/5ML
650 LIQUID (ML) ORAL EVERY 6 HOURS
Refills: 0 | Status: DISCONTINUED | OUTPATIENT
Start: 2025-03-21 | End: 2025-03-22

## 2025-03-21 RX ORDER — LEVOTHYROXINE SODIUM 300 MCG
50 TABLET ORAL DAILY
Refills: 0 | Status: DISCONTINUED | OUTPATIENT
Start: 2025-03-21 | End: 2025-03-22

## 2025-03-21 RX ORDER — ATORVASTATIN CALCIUM 80 MG/1
10 TABLET, FILM COATED ORAL AT BEDTIME
Refills: 0 | Status: DISCONTINUED | OUTPATIENT
Start: 2025-03-21 | End: 2025-03-22

## 2025-03-21 RX ORDER — EZETIMIBE 10 MG/1
10 TABLET ORAL DAILY
Refills: 0 | Status: DISCONTINUED | OUTPATIENT
Start: 2025-03-21 | End: 2025-03-22

## 2025-03-21 RX ORDER — SUCRALFATE 1 G
1 TABLET ORAL
Refills: 0 | Status: DISCONTINUED | OUTPATIENT
Start: 2025-03-21 | End: 2025-03-22

## 2025-03-21 RX ADMIN — Medication 100 MILLIEQUIVALENT(S): at 23:51

## 2025-03-21 RX ADMIN — ATORVASTATIN CALCIUM 10 MILLIGRAM(S): 80 TABLET, FILM COATED ORAL at 22:53

## 2025-03-21 RX ADMIN — Medication 100 MILLIEQUIVALENT(S): at 22:58

## 2025-03-21 RX ADMIN — Medication 40 MILLIGRAM(S): at 18:18

## 2025-03-21 RX ADMIN — Medication 1 GRAM(S): at 19:33

## 2025-03-22 ENCOUNTER — TRANSCRIPTION ENCOUNTER (OUTPATIENT)
Age: 75
End: 2025-03-22

## 2025-03-22 VITALS
DIASTOLIC BLOOD PRESSURE: 78 MMHG | OXYGEN SATURATION: 94 % | SYSTOLIC BLOOD PRESSURE: 130 MMHG | HEART RATE: 82 BPM | RESPIRATION RATE: 18 BRPM | TEMPERATURE: 98 F

## 2025-03-22 DIAGNOSIS — R79.89 OTHER SPECIFIED ABNORMAL FINDINGS OF BLOOD CHEMISTRY: ICD-10-CM

## 2025-03-22 DIAGNOSIS — E03.9 HYPOTHYROIDISM, UNSPECIFIED: ICD-10-CM

## 2025-03-22 DIAGNOSIS — K92.1 MELENA: ICD-10-CM

## 2025-03-22 DIAGNOSIS — D62 ACUTE POSTHEMORRHAGIC ANEMIA: ICD-10-CM

## 2025-03-22 LAB
ANION GAP SERPL CALC-SCNC: 7 MMOL/L — SIGNIFICANT CHANGE UP (ref 5–17)
BUN SERPL-MCNC: 7 MG/DL — SIGNIFICANT CHANGE UP (ref 7–23)
CALCIUM SERPL-MCNC: 8.2 MG/DL — LOW (ref 8.4–10.5)
CHLORIDE SERPL-SCNC: 108 MMOL/L — SIGNIFICANT CHANGE UP (ref 96–108)
CK SERPL-CCNC: 49 U/L — SIGNIFICANT CHANGE UP (ref 26–192)
CK SERPL-CCNC: 66 U/L — SIGNIFICANT CHANGE UP (ref 26–192)
CO2 SERPL-SCNC: 28 MMOL/L — SIGNIFICANT CHANGE UP (ref 22–31)
CREAT SERPL-MCNC: 0.58 MG/DL — SIGNIFICANT CHANGE UP (ref 0.5–1.3)
EGFR: 95 ML/MIN/1.73M2 — SIGNIFICANT CHANGE UP
EGFR: 95 ML/MIN/1.73M2 — SIGNIFICANT CHANGE UP
GLUCOSE SERPL-MCNC: 103 MG/DL — HIGH (ref 70–99)
HCT VFR BLD CALC: 23.1 % — LOW (ref 34.5–45)
HCT VFR BLD CALC: 28.6 % — LOW (ref 34.5–45)
HGB BLD-MCNC: 7.5 G/DL — LOW (ref 11.5–15.5)
HGB BLD-MCNC: 9.5 G/DL — LOW (ref 11.5–15.5)
MCHC RBC-ENTMCNC: 28.8 PG — SIGNIFICANT CHANGE UP (ref 27–34)
MCHC RBC-ENTMCNC: 29.3 PG — SIGNIFICANT CHANGE UP (ref 27–34)
MCHC RBC-ENTMCNC: 32.5 G/DL — SIGNIFICANT CHANGE UP (ref 32–36)
MCHC RBC-ENTMCNC: 33.2 G/DL — SIGNIFICANT CHANGE UP (ref 32–36)
MCV RBC AUTO: 88.3 FL — SIGNIFICANT CHANGE UP (ref 80–100)
MCV RBC AUTO: 88.8 FL — SIGNIFICANT CHANGE UP (ref 80–100)
NRBC BLD AUTO-RTO: 0 /100 WBCS — SIGNIFICANT CHANGE UP (ref 0–0)
NRBC BLD AUTO-RTO: 0 /100 WBCS — SIGNIFICANT CHANGE UP (ref 0–0)
PLATELET # BLD AUTO: 323 K/UL — SIGNIFICANT CHANGE UP (ref 150–400)
PLATELET # BLD AUTO: 334 K/UL — SIGNIFICANT CHANGE UP (ref 150–400)
POTASSIUM SERPL-MCNC: 3.6 MMOL/L — SIGNIFICANT CHANGE UP (ref 3.5–5.3)
POTASSIUM SERPL-SCNC: 3.6 MMOL/L — SIGNIFICANT CHANGE UP (ref 3.5–5.3)
RBC # BLD: 2.6 M/UL — LOW (ref 3.8–5.2)
RBC # BLD: 3.24 M/UL — LOW (ref 3.8–5.2)
RBC # FLD: 16.4 % — HIGH (ref 10.3–14.5)
RBC # FLD: 16.5 % — HIGH (ref 10.3–14.5)
SODIUM SERPL-SCNC: 143 MMOL/L — SIGNIFICANT CHANGE UP (ref 135–145)
TROPONIN I, HIGH SENSITIVITY RESULT: 103.8 NG/L — HIGH
WBC # BLD: 6.67 K/UL — SIGNIFICANT CHANGE UP (ref 3.8–10.5)
WBC # BLD: 6.99 K/UL — SIGNIFICANT CHANGE UP (ref 3.8–10.5)
WBC # FLD AUTO: 6.67 K/UL — SIGNIFICANT CHANGE UP (ref 3.8–10.5)
WBC # FLD AUTO: 6.99 K/UL — SIGNIFICANT CHANGE UP (ref 3.8–10.5)

## 2025-03-22 PROCEDURE — P9016: CPT

## 2025-03-22 PROCEDURE — 86900 BLOOD TYPING SEROLOGIC ABO: CPT

## 2025-03-22 PROCEDURE — 74177 CT ABD & PELVIS W/CONTRAST: CPT | Mod: MC

## 2025-03-22 PROCEDURE — 86923 COMPATIBILITY TEST ELECTRIC: CPT

## 2025-03-22 PROCEDURE — 86850 RBC ANTIBODY SCREEN: CPT

## 2025-03-22 PROCEDURE — 85610 PROTHROMBIN TIME: CPT

## 2025-03-22 PROCEDURE — 80048 BASIC METABOLIC PNL TOTAL CA: CPT

## 2025-03-22 PROCEDURE — 93005 ELECTROCARDIOGRAM TRACING: CPT

## 2025-03-22 PROCEDURE — 96374 THER/PROPH/DIAG INJ IV PUSH: CPT

## 2025-03-22 PROCEDURE — 85730 THROMBOPLASTIN TIME PARTIAL: CPT

## 2025-03-22 PROCEDURE — 93306 TTE W/DOPPLER COMPLETE: CPT

## 2025-03-22 PROCEDURE — 85027 COMPLETE CBC AUTOMATED: CPT

## 2025-03-22 PROCEDURE — 36430 TRANSFUSION BLD/BLD COMPNT: CPT

## 2025-03-22 PROCEDURE — 84484 ASSAY OF TROPONIN QUANT: CPT

## 2025-03-22 PROCEDURE — 36415 COLL VENOUS BLD VENIPUNCTURE: CPT

## 2025-03-22 PROCEDURE — 86901 BLOOD TYPING SEROLOGIC RH(D): CPT

## 2025-03-22 PROCEDURE — 71045 X-RAY EXAM CHEST 1 VIEW: CPT

## 2025-03-22 PROCEDURE — 82550 ASSAY OF CK (CPK): CPT

## 2025-03-22 PROCEDURE — 80053 COMPREHEN METABOLIC PANEL: CPT

## 2025-03-22 PROCEDURE — 99285 EMERGENCY DEPT VISIT HI MDM: CPT | Mod: 25

## 2025-03-22 PROCEDURE — 85025 COMPLETE CBC W/AUTO DIFF WBC: CPT

## 2025-03-22 PROCEDURE — 82272 OCCULT BLD FECES 1-3 TESTS: CPT

## 2025-03-22 RX ORDER — SUCRALFATE 1 G
10 TABLET ORAL
Qty: 140 | Refills: 0
Start: 2025-03-22 | End: 2025-03-28

## 2025-03-22 RX ORDER — FUROSEMIDE 10 MG/ML
20 INJECTION INTRAMUSCULAR; INTRAVENOUS ONCE
Refills: 0 | Status: COMPLETED | OUTPATIENT
Start: 2025-03-22 | End: 2025-03-22

## 2025-03-22 RX ADMIN — Medication 100 MILLIEQUIVALENT(S): at 00:38

## 2025-03-22 RX ADMIN — Medication 1 GRAM(S): at 06:11

## 2025-03-22 RX ADMIN — Medication 40 MILLIGRAM(S): at 06:10

## 2025-03-22 RX ADMIN — FUROSEMIDE 20 MILLIGRAM(S): 10 INJECTION INTRAMUSCULAR; INTRAVENOUS at 10:30

## 2025-03-22 RX ADMIN — EZETIMIBE 10 MILLIGRAM(S): 10 TABLET ORAL at 11:20

## 2025-03-22 RX ADMIN — Medication 1 GRAM(S): at 17:00

## 2025-03-22 RX ADMIN — Medication 50 MICROGRAM(S): at 06:09

## 2025-03-22 RX ADMIN — Medication 40 MILLIGRAM(S): at 17:00

## 2025-04-04 DIAGNOSIS — D50.8 OTHER IRON DEFICIENCY ANEMIAS: ICD-10-CM

## 2025-04-04 LAB
ALBUMIN SERPL ELPH-MCNC: 4.2 G/DL
ALP BLD-CCNC: 84 U/L
ALT SERPL-CCNC: 14 U/L
ANION GAP SERPL CALC-SCNC: 13 MMOL/L
AST SERPL-CCNC: 14 U/L
BASOPHILS # BLD AUTO: 0.09 K/UL
BASOPHILS NFR BLD AUTO: 1.8 %
BILIRUB SERPL-MCNC: 1 MG/DL
BUN SERPL-MCNC: 14 MG/DL
CALCIUM SERPL-MCNC: 8.9 MG/DL
CHLORIDE SERPL-SCNC: 105 MMOL/L
CO2 SERPL-SCNC: 23 MMOL/L
CREAT SERPL-MCNC: 0.67 MG/DL
EGFRCR SERPLBLD CKD-EPI 2021: 92 ML/MIN/1.73M2
EOSINOPHIL # BLD AUTO: 0.07 K/UL
EOSINOPHIL NFR BLD AUTO: 1.4 %
FERRITIN SERPL-MCNC: 24 NG/ML
FOLATE SERPL-MCNC: 17.1 NG/ML
GLUCOSE SERPL-MCNC: 180 MG/DL
HCT VFR BLD CALC: 31.6 %
HGB BLD-MCNC: 10 G/DL
IMM GRANULOCYTES NFR BLD AUTO: 0.4 %
IRON SATN MFR SERPL: 9 %
IRON SERPL-MCNC: 33 UG/DL
LYMPHOCYTES # BLD AUTO: 1.31 K/UL
LYMPHOCYTES NFR BLD AUTO: 26.8 %
MAN DIFF?: NORMAL
MCHC RBC-ENTMCNC: 28.9 PG
MCHC RBC-ENTMCNC: 31.6 G/DL
MCV RBC AUTO: 91.3 FL
MONOCYTES # BLD AUTO: 0.46 K/UL
MONOCYTES NFR BLD AUTO: 9.4 %
NEUTROPHILS # BLD AUTO: 2.94 K/UL
NEUTROPHILS NFR BLD AUTO: 60.2 %
PLATELET # BLD AUTO: 323 K/UL
POTASSIUM SERPL-SCNC: 4 MMOL/L
PROT SERPL-MCNC: 6.4 G/DL
RBC # BLD: 3.46 M/UL
RBC # FLD: 14.3 %
SODIUM SERPL-SCNC: 142 MMOL/L
TIBC SERPL-MCNC: 356 UG/DL
UIBC SERPL-MCNC: 323 UG/DL
VIT B12 SERPL-MCNC: 683 PG/ML
WBC # FLD AUTO: 4.89 K/UL

## 2025-06-20 NOTE — PATIENT PROFILE ADULT - FUNCTIONAL SCREEN CURRENT LEVEL: SWALLOWING (IF SCORE 2 OR MORE FOR ANY ITEM, CONSULT REHAB SERVICES), MLM)
